# Patient Record
Sex: MALE | Race: BLACK OR AFRICAN AMERICAN | Employment: UNEMPLOYED | URBAN - METROPOLITAN AREA
[De-identification: names, ages, dates, MRNs, and addresses within clinical notes are randomized per-mention and may not be internally consistent; named-entity substitution may affect disease eponyms.]

---

## 2019-02-14 ENCOUNTER — HOSPITAL ENCOUNTER (OUTPATIENT)
Dept: REHABILITATION | Age: 49
End: 2019-03-29
Attending: PHYSICAL MEDICINE & REHABILITATION | Admitting: PHYSICAL MEDICINE & REHABILITATION

## 2019-02-14 DIAGNOSIS — I63.9 CEREBRAL INFARCTION (HCC): ICD-10-CM

## 2019-02-14 DIAGNOSIS — R13.12 DYSPHAGIA, OROPHARYNGEAL PHASE: ICD-10-CM

## 2019-02-15 LAB
25(OH)D3 SERPL-MCNC: 12.6 NG/ML (ref 30–100)
ALBUMIN SERPL-MCNC: 2.3 G/DL (ref 3.5–5)
ALBUMIN/GLOB SERPL: 0.6 {RATIO} (ref 1.1–2.2)
ALP SERPL-CCNC: 95 U/L (ref 45–117)
ALT SERPL-CCNC: 42 U/L (ref 12–78)
ANION GAP SERPL CALC-SCNC: 7 MMOL/L (ref 5–15)
APPEARANCE UR: CLEAR
AST SERPL-CCNC: 14 U/L (ref 15–37)
BACTERIA URNS QL MICRO: NEGATIVE /HPF
BILIRUB SERPL-MCNC: 0.3 MG/DL (ref 0.2–1)
BILIRUB UR QL: NEGATIVE
BUN SERPL-MCNC: 20 MG/DL (ref 6–20)
BUN/CREAT SERPL: 15 (ref 12–20)
CALCIUM SERPL-MCNC: 8.3 MG/DL (ref 8.5–10.1)
CHLORIDE SERPL-SCNC: 105 MMOL/L (ref 97–108)
CO2 SERPL-SCNC: 28 MMOL/L (ref 21–32)
COLOR UR: ABNORMAL
CREAT SERPL-MCNC: 1.33 MG/DL (ref 0.7–1.3)
EPITH CASTS URNS QL MICRO: ABNORMAL /LPF
ERYTHROCYTE [DISTWIDTH] IN BLOOD BY AUTOMATED COUNT: 15 % (ref 11.5–14.5)
GLOBULIN SER CALC-MCNC: 3.6 G/DL (ref 2–4)
GLUCOSE SERPL-MCNC: 112 MG/DL (ref 65–100)
GLUCOSE UR STRIP.AUTO-MCNC: NEGATIVE MG/DL
HCT VFR BLD AUTO: 27.8 % (ref 36.6–50.3)
HGB BLD-MCNC: 9.5 G/DL (ref 12.1–17)
HGB UR QL STRIP: NEGATIVE
HYALINE CASTS URNS QL MICRO: ABNORMAL /LPF (ref 0–5)
KETONES UR QL STRIP.AUTO: NEGATIVE MG/DL
LEUKOCYTE ESTERASE UR QL STRIP.AUTO: NEGATIVE
MAGNESIUM SERPL-MCNC: 2.1 MG/DL (ref 1.6–2.4)
MCH RBC QN AUTO: 31.3 PG (ref 26–34)
MCHC RBC AUTO-ENTMCNC: 34.2 G/DL (ref 30–36.5)
MCV RBC AUTO: 91.4 FL (ref 80–99)
NITRITE UR QL STRIP.AUTO: NEGATIVE
NRBC # BLD: 0 K/UL (ref 0–0.01)
NRBC BLD-RTO: 0 PER 100 WBC
PH UR STRIP: 7 [PH] (ref 5–8)
PLATELET # BLD AUTO: 415 K/UL (ref 150–400)
PMV BLD AUTO: 9.5 FL (ref 8.9–12.9)
POTASSIUM SERPL-SCNC: 3.6 MMOL/L (ref 3.5–5.1)
PROT SERPL-MCNC: 5.9 G/DL (ref 6.4–8.2)
PROT UR STRIP-MCNC: 100 MG/DL
RBC # BLD AUTO: 3.04 M/UL (ref 4.1–5.7)
RBC #/AREA URNS HPF: ABNORMAL /HPF (ref 0–5)
SODIUM SERPL-SCNC: 140 MMOL/L (ref 136–145)
SP GR UR REFRACTOMETRY: 1.01 (ref 1–1.03)
UA: UC IF INDICATED,UAUC: ABNORMAL
UROBILINOGEN UR QL STRIP.AUTO: 1 EU/DL (ref 0.2–1)
WBC # BLD AUTO: 7.5 K/UL (ref 4.1–11.1)
WBC URNS QL MICRO: ABNORMAL /HPF (ref 0–4)

## 2019-02-15 PROCEDURE — 83735 ASSAY OF MAGNESIUM: CPT

## 2019-02-15 PROCEDURE — 81001 URINALYSIS AUTO W/SCOPE: CPT

## 2019-02-15 PROCEDURE — 80053 COMPREHEN METABOLIC PANEL: CPT

## 2019-02-15 PROCEDURE — 36415 COLL VENOUS BLD VENIPUNCTURE: CPT

## 2019-02-15 PROCEDURE — 82306 VITAMIN D 25 HYDROXY: CPT

## 2019-02-15 PROCEDURE — 85027 COMPLETE CBC AUTOMATED: CPT

## 2019-02-16 LAB
ANION GAP SERPL CALC-SCNC: 6 MMOL/L (ref 5–15)
BUN SERPL-MCNC: 25 MG/DL (ref 6–20)
BUN/CREAT SERPL: 16 (ref 12–20)
CALCIUM SERPL-MCNC: 8 MG/DL (ref 8.5–10.1)
CHLORIDE SERPL-SCNC: 105 MMOL/L (ref 97–108)
CO2 SERPL-SCNC: 29 MMOL/L (ref 21–32)
CREAT SERPL-MCNC: 1.58 MG/DL (ref 0.7–1.3)
GLUCOSE SERPL-MCNC: 161 MG/DL (ref 65–100)
POTASSIUM SERPL-SCNC: 4.2 MMOL/L (ref 3.5–5.1)
SODIUM SERPL-SCNC: 140 MMOL/L (ref 136–145)

## 2019-02-16 PROCEDURE — 36415 COLL VENOUS BLD VENIPUNCTURE: CPT

## 2019-02-16 PROCEDURE — 80048 BASIC METABOLIC PNL TOTAL CA: CPT

## 2019-02-16 PROCEDURE — 80177 DRUG SCRN QUAN LEVETIRACETAM: CPT

## 2019-02-17 LAB — LEVETIRACETAM SERPL-MCNC: 41.5 UG/ML (ref 10–40)

## 2019-02-18 ENCOUNTER — APPOINTMENT (OUTPATIENT)
Dept: GENERAL RADIOLOGY | Age: 49
DRG: 175 | End: 2019-02-18
Attending: EMERGENCY MEDICINE
Payer: MEDICAID

## 2019-02-18 ENCOUNTER — HOSPITAL ENCOUNTER (INPATIENT)
Age: 49
LOS: 1 days | Discharge: NURSING, REHAB OR LONG-TERM CARE | DRG: 175 | End: 2019-02-19
Attending: EMERGENCY MEDICINE | Admitting: FAMILY MEDICINE
Payer: MEDICAID

## 2019-02-18 ENCOUNTER — ANESTHESIA (OUTPATIENT)
Dept: CARDIAC CATH/INVASIVE PROCEDURES | Age: 49
End: 2019-02-18

## 2019-02-18 ENCOUNTER — ANESTHESIA EVENT (OUTPATIENT)
Dept: CARDIAC CATH/INVASIVE PROCEDURES | Age: 49
End: 2019-02-18

## 2019-02-18 ENCOUNTER — APPOINTMENT (OUTPATIENT)
Dept: NON INVASIVE DIAGNOSTICS | Age: 49
DRG: 175 | End: 2019-02-18
Attending: INTERNAL MEDICINE
Payer: MEDICAID

## 2019-02-18 DIAGNOSIS — I48.92 ATRIAL FLUTTER WITH RAPID VENTRICULAR RESPONSE (HCC): Primary | ICD-10-CM

## 2019-02-18 DIAGNOSIS — I48.92 ATRIAL FLUTTER, UNSPECIFIED TYPE (HCC): ICD-10-CM

## 2019-02-18 DIAGNOSIS — I95.9 HYPOTENSION, UNSPECIFIED HYPOTENSION TYPE: ICD-10-CM

## 2019-02-18 DIAGNOSIS — I69.320 CVA, OLD, APHASIA: ICD-10-CM

## 2019-02-18 PROBLEM — E78.49 OTHER HYPERLIPIDEMIA: Status: ACTIVE | Noted: 2019-02-18

## 2019-02-18 PROBLEM — E11.9 DIABETES MELLITUS TYPE 2, CONTROLLED (HCC): Status: ACTIVE | Noted: 2019-02-18

## 2019-02-18 PROBLEM — I63.9 STROKE (CEREBRUM) (HCC): Status: ACTIVE | Noted: 2019-02-18

## 2019-02-18 LAB
ALBUMIN SERPL-MCNC: 2.6 G/DL (ref 3.5–5)
ALBUMIN/GLOB SERPL: 0.7 {RATIO} (ref 1.1–2.2)
ALP SERPL-CCNC: 105 U/L (ref 45–117)
ALT SERPL-CCNC: 39 U/L (ref 12–78)
ANION GAP SERPL CALC-SCNC: 6 MMOL/L (ref 5–15)
ANION GAP SERPL CALC-SCNC: 6 MMOL/L (ref 5–15)
AST SERPL-CCNC: 14 U/L (ref 15–37)
ATRIAL RATE: 288 BPM
BASOPHILS # BLD: 0.1 K/UL (ref 0–0.1)
BASOPHILS NFR BLD: 1 % (ref 0–1)
BILIRUB SERPL-MCNC: 0.2 MG/DL (ref 0.2–1)
BUN SERPL-MCNC: 25 MG/DL (ref 6–20)
BUN SERPL-MCNC: 26 MG/DL (ref 6–20)
BUN/CREAT SERPL: 19 (ref 12–20)
BUN/CREAT SERPL: 21 (ref 12–20)
CALCIUM SERPL-MCNC: 8.4 MG/DL (ref 8.5–10.1)
CALCIUM SERPL-MCNC: 8.5 MG/DL (ref 8.5–10.1)
CALCULATED P AXIS, ECG09: -90 DEGREES
CALCULATED R AXIS, ECG10: 28 DEGREES
CALCULATED T AXIS, ECG11: 96 DEGREES
CHLORIDE SERPL-SCNC: 104 MMOL/L (ref 97–108)
CHLORIDE SERPL-SCNC: 104 MMOL/L (ref 97–108)
CK SERPL-CCNC: 23 U/L (ref 39–308)
CO2 SERPL-SCNC: 29 MMOL/L (ref 21–32)
CO2 SERPL-SCNC: 30 MMOL/L (ref 21–32)
CREAT SERPL-MCNC: 1.2 MG/DL (ref 0.7–1.3)
CREAT SERPL-MCNC: 1.34 MG/DL (ref 0.7–1.3)
DIAGNOSIS, 93000: NORMAL
DIFFERENTIAL METHOD BLD: ABNORMAL
EOSINOPHIL # BLD: 0.1 K/UL (ref 0–0.4)
EOSINOPHIL NFR BLD: 1 % (ref 0–7)
ERYTHROCYTE [DISTWIDTH] IN BLOOD BY AUTOMATED COUNT: 14.6 % (ref 11.5–14.5)
EST. AVERAGE GLUCOSE BLD GHB EST-MCNC: 171 MG/DL
GLOBULIN SER CALC-MCNC: 3.5 G/DL (ref 2–4)
GLUCOSE BLD STRIP.AUTO-MCNC: 191 MG/DL (ref 65–100)
GLUCOSE BLD STRIP.AUTO-MCNC: 218 MG/DL (ref 65–100)
GLUCOSE BLD STRIP.AUTO-MCNC: 236 MG/DL (ref 65–100)
GLUCOSE SERPL-MCNC: 161 MG/DL (ref 65–100)
GLUCOSE SERPL-MCNC: 194 MG/DL (ref 65–100)
HBA1C MFR BLD: 7.6 % (ref 4.2–6.3)
HCT VFR BLD AUTO: 30.9 % (ref 36.6–50.3)
HGB BLD-MCNC: 10.3 G/DL (ref 12.1–17)
IMM GRANULOCYTES # BLD AUTO: 0 K/UL (ref 0–0.04)
IMM GRANULOCYTES NFR BLD AUTO: 0 % (ref 0–0.5)
INR PPP: 1.7 (ref 0.9–1.1)
LYMPHOCYTES # BLD: 3.6 K/UL (ref 0.8–3.5)
LYMPHOCYTES NFR BLD: 29 % (ref 12–49)
MAGNESIUM SERPL-MCNC: 2.3 MG/DL (ref 1.6–2.4)
MCH RBC QN AUTO: 31.2 PG (ref 26–34)
MCHC RBC AUTO-ENTMCNC: 33.3 G/DL (ref 30–36.5)
MCV RBC AUTO: 93.6 FL (ref 80–99)
MONOCYTES # BLD: 1.3 K/UL (ref 0–1)
MONOCYTES NFR BLD: 10 % (ref 5–13)
NEUTS SEG # BLD: 7.4 K/UL (ref 1.8–8)
NEUTS SEG NFR BLD: 60 % (ref 32–75)
NRBC # BLD: 0 K/UL (ref 0–0.01)
NRBC BLD-RTO: 0 PER 100 WBC
PLATELET # BLD AUTO: 398 K/UL (ref 150–400)
PMV BLD AUTO: 9.3 FL (ref 8.9–12.9)
POTASSIUM SERPL-SCNC: 4.2 MMOL/L (ref 3.5–5.1)
POTASSIUM SERPL-SCNC: 4.4 MMOL/L (ref 3.5–5.1)
PROT SERPL-MCNC: 6.1 G/DL (ref 6.4–8.2)
PROTHROMBIN TIME: 16.6 SEC (ref 9–11.1)
Q-T INTERVAL, ECG07: 362 MS
QRS DURATION, ECG06: 86 MS
QTC CALCULATION (BEZET), ECG08: 524 MS
RBC # BLD AUTO: 3.3 M/UL (ref 4.1–5.7)
SERVICE CMNT-IMP: ABNORMAL
SODIUM SERPL-SCNC: 139 MMOL/L (ref 136–145)
SODIUM SERPL-SCNC: 140 MMOL/L (ref 136–145)
TROPONIN I SERPL-MCNC: <0.05 NG/ML
VENTRICULAR RATE, ECG03: 126 BPM
WBC # BLD AUTO: 12.3 K/UL (ref 4.1–11.1)

## 2019-02-18 PROCEDURE — 36415 COLL VENOUS BLD VENIPUNCTURE: CPT

## 2019-02-18 PROCEDURE — 83036 HEMOGLOBIN GLYCOSYLATED A1C: CPT

## 2019-02-18 PROCEDURE — 80053 COMPREHEN METABOLIC PANEL: CPT

## 2019-02-18 PROCEDURE — 4A023FZ MEASUREMENT OF CARDIAC RHYTHM, PERCUTANEOUS APPROACH: ICD-10-PCS | Performed by: INTERNAL MEDICINE

## 2019-02-18 PROCEDURE — 77030018729 HC ELECTRD DEFIB PAD CARD -B: Performed by: INTERNAL MEDICINE

## 2019-02-18 PROCEDURE — 99153 MOD SED SAME PHYS/QHP EA: CPT | Performed by: INTERNAL MEDICINE

## 2019-02-18 PROCEDURE — 93005 ELECTROCARDIOGRAM TRACING: CPT

## 2019-02-18 PROCEDURE — 74011250636 HC RX REV CODE- 250/636

## 2019-02-18 PROCEDURE — 02583ZZ DESTRUCTION OF CONDUCTION MECHANISM, PERCUTANEOUS APPROACH: ICD-10-PCS | Performed by: INTERNAL MEDICINE

## 2019-02-18 PROCEDURE — 96360 HYDRATION IV INFUSION INIT: CPT

## 2019-02-18 PROCEDURE — C1733 CATH, EP, OTHR THAN COOL-TIP: HCPCS | Performed by: INTERNAL MEDICINE

## 2019-02-18 PROCEDURE — 93312 ECHO TRANSESOPHAGEAL: CPT

## 2019-02-18 PROCEDURE — 77030030806 HC PTCH ENSIT NAVX STJU -G: Performed by: INTERNAL MEDICINE

## 2019-02-18 PROCEDURE — 71045 X-RAY EXAM CHEST 1 VIEW: CPT

## 2019-02-18 PROCEDURE — C1894 INTRO/SHEATH, NON-LASER: HCPCS | Performed by: INTERNAL MEDICINE

## 2019-02-18 PROCEDURE — 74011250637 HC RX REV CODE- 250/637: Performed by: FAMILY MEDICINE

## 2019-02-18 PROCEDURE — 74011250637 HC RX REV CODE- 250/637: Performed by: INTERNAL MEDICINE

## 2019-02-18 PROCEDURE — 74011250636 HC RX REV CODE- 250/636: Performed by: FAMILY MEDICINE

## 2019-02-18 PROCEDURE — 77010033678 HC OXYGEN DAILY

## 2019-02-18 PROCEDURE — 02K83ZZ MAP CONDUCTION MECHANISM, PERCUTANEOUS APPROACH: ICD-10-PCS | Performed by: INTERNAL MEDICINE

## 2019-02-18 PROCEDURE — 84484 ASSAY OF TROPONIN QUANT: CPT

## 2019-02-18 PROCEDURE — 93621 COMP EP EVL L PAC&REC C SINS: CPT | Performed by: INTERNAL MEDICINE

## 2019-02-18 PROCEDURE — 74011250636 HC RX REV CODE- 250/636: Performed by: INTERNAL MEDICINE

## 2019-02-18 PROCEDURE — 93653 COMPRE EP EVAL TX SVT: CPT | Performed by: INTERNAL MEDICINE

## 2019-02-18 PROCEDURE — 93613 INTRACARDIAC EPHYS 3D MAPG: CPT

## 2019-02-18 PROCEDURE — 85025 COMPLETE CBC W/AUTO DIFF WBC: CPT

## 2019-02-18 PROCEDURE — C1730 CATH, EP, 19 OR FEW ELECT: HCPCS | Performed by: INTERNAL MEDICINE

## 2019-02-18 PROCEDURE — 77030015398 HC CBL EP EXT STJU -C: Performed by: INTERNAL MEDICINE

## 2019-02-18 PROCEDURE — 82962 GLUCOSE BLOOD TEST: CPT

## 2019-02-18 PROCEDURE — 65660000000 HC RM CCU STEPDOWN

## 2019-02-18 PROCEDURE — 83735 ASSAY OF MAGNESIUM: CPT

## 2019-02-18 PROCEDURE — 85610 PROTHROMBIN TIME: CPT

## 2019-02-18 PROCEDURE — 77030018798 HC PMP KT ENTRL FED COVD -A

## 2019-02-18 PROCEDURE — 82550 ASSAY OF CK (CPK): CPT

## 2019-02-18 PROCEDURE — 74011636637 HC RX REV CODE- 636/637: Performed by: FAMILY MEDICINE

## 2019-02-18 PROCEDURE — 99285 EMERGENCY DEPT VISIT HI MDM: CPT

## 2019-02-18 PROCEDURE — 99152 MOD SED SAME PHYS/QHP 5/>YRS: CPT | Performed by: INTERNAL MEDICINE

## 2019-02-18 PROCEDURE — 74011250636 HC RX REV CODE- 250/636: Performed by: EMERGENCY MEDICINE

## 2019-02-18 RX ORDER — SODIUM CHLORIDE 0.9 % (FLUSH) 0.9 %
5-40 SYRINGE (ML) INJECTION AS NEEDED
Status: DISCONTINUED | OUTPATIENT
Start: 2019-02-18 | End: 2019-02-19 | Stop reason: HOSPADM

## 2019-02-18 RX ORDER — PANTOPRAZOLE SODIUM 40 MG/1
40 GRANULE, DELAYED RELEASE ORAL
COMMUNITY

## 2019-02-18 RX ORDER — ATORVASTATIN CALCIUM 80 MG/1
80 TABLET, FILM COATED ORAL
COMMUNITY

## 2019-02-18 RX ORDER — ACETAMINOPHEN 325 MG/1
650 TABLET ORAL
COMMUNITY

## 2019-02-18 RX ORDER — DILTIAZEM HYDROCHLORIDE 30 MG/1
90 TABLET, FILM COATED ORAL 4 TIMES DAILY
Status: ON HOLD | COMMUNITY
End: 2019-02-19 | Stop reason: SDUPTHER

## 2019-02-18 RX ORDER — SERTRALINE HYDROCHLORIDE 50 MG/1
50 TABLET, FILM COATED ORAL DAILY
Status: DISCONTINUED | OUTPATIENT
Start: 2019-02-19 | End: 2019-02-18

## 2019-02-18 RX ORDER — TRAMADOL HYDROCHLORIDE 50 MG/1
25-50 TABLET ORAL
COMMUNITY

## 2019-02-18 RX ORDER — ENOXAPARIN SODIUM 100 MG/ML
60 INJECTION SUBCUTANEOUS EVERY 12 HOURS
Status: DISCONTINUED | OUTPATIENT
Start: 2019-02-18 | End: 2019-02-19 | Stop reason: SDUPTHER

## 2019-02-18 RX ORDER — MAGNESIUM SULFATE 100 %
4 CRYSTALS MISCELLANEOUS AS NEEDED
Status: DISCONTINUED | OUTPATIENT
Start: 2019-02-18 | End: 2019-02-19 | Stop reason: HOSPADM

## 2019-02-18 RX ORDER — LEVETIRACETAM 100 MG/ML
750 SOLUTION ORAL EVERY 12 HOURS
COMMUNITY

## 2019-02-18 RX ORDER — SERTRALINE HYDROCHLORIDE 50 MG/1
100 TABLET, FILM COATED ORAL DAILY
Status: DISCONTINUED | OUTPATIENT
Start: 2019-02-19 | End: 2019-02-19 | Stop reason: HOSPADM

## 2019-02-18 RX ORDER — INSULIN LISPRO 100 [IU]/ML
INJECTION, SOLUTION INTRAVENOUS; SUBCUTANEOUS
Status: DISCONTINUED | OUTPATIENT
Start: 2019-02-18 | End: 2019-02-19 | Stop reason: HOSPADM

## 2019-02-18 RX ORDER — LEVETIRACETAM 100 MG/ML
750 SOLUTION ORAL 2 TIMES DAILY
Status: DISCONTINUED | OUTPATIENT
Start: 2019-02-18 | End: 2019-02-19 | Stop reason: HOSPADM

## 2019-02-18 RX ORDER — PREDNISONE 10 MG/1
5-10 TABLET ORAL
COMMUNITY

## 2019-02-18 RX ORDER — IPRATROPIUM BROMIDE AND ALBUTEROL SULFATE 2.5; .5 MG/3ML; MG/3ML
3 SOLUTION RESPIRATORY (INHALATION)
Status: DISCONTINUED | OUTPATIENT
Start: 2019-02-18 | End: 2019-02-19 | Stop reason: HOSPADM

## 2019-02-18 RX ORDER — SODIUM CHLORIDE 0.9 % (FLUSH) 0.9 %
5-40 SYRINGE (ML) INJECTION EVERY 8 HOURS
Status: DISCONTINUED | OUTPATIENT
Start: 2019-02-18 | End: 2019-02-19 | Stop reason: HOSPADM

## 2019-02-18 RX ORDER — GUAIFENESIN 100 MG/5ML
200 SOLUTION ORAL
COMMUNITY

## 2019-02-18 RX ORDER — HEPARIN SODIUM 200 [USP'U]/100ML
INJECTION, SOLUTION INTRAVENOUS
Status: COMPLETED | OUTPATIENT
Start: 2019-02-18 | End: 2019-02-18

## 2019-02-18 RX ORDER — NYSTATIN 100000 [USP'U]/ML
1 SUSPENSION ORAL 4 TIMES DAILY
COMMUNITY

## 2019-02-18 RX ORDER — PANTOPRAZOLE SODIUM 40 MG/1
40 TABLET, DELAYED RELEASE ORAL
Status: DISCONTINUED | OUTPATIENT
Start: 2019-02-19 | End: 2019-02-19 | Stop reason: HOSPADM

## 2019-02-18 RX ORDER — ACETAMINOPHEN 325 MG/1
650 TABLET ORAL
Status: DISCONTINUED | OUTPATIENT
Start: 2019-02-18 | End: 2019-02-19 | Stop reason: HOSPADM

## 2019-02-18 RX ORDER — ATORVASTATIN CALCIUM 40 MG/1
80 TABLET, FILM COATED ORAL DAILY
Status: DISCONTINUED | OUTPATIENT
Start: 2019-02-19 | End: 2019-02-19 | Stop reason: HOSPADM

## 2019-02-18 RX ORDER — WARFARIN 7.5 MG/1
6 TABLET ORAL EVERY EVENING
COMMUNITY

## 2019-02-18 RX ORDER — NYSTATIN 100000 [USP'U]/ML
500000 SUSPENSION ORAL 4 TIMES DAILY
Status: DISCONTINUED | OUTPATIENT
Start: 2019-02-18 | End: 2019-02-19 | Stop reason: HOSPADM

## 2019-02-18 RX ORDER — SODIUM CHLORIDE 9 MG/ML
50 INJECTION, SOLUTION INTRAVENOUS CONTINUOUS
Status: DISCONTINUED | OUTPATIENT
Start: 2019-02-18 | End: 2019-02-19

## 2019-02-18 RX ORDER — PREDNISONE 5 MG/1
5 TABLET ORAL
Status: DISCONTINUED | OUTPATIENT
Start: 2019-02-19 | End: 2019-02-19 | Stop reason: HOSPADM

## 2019-02-18 RX ORDER — DEXTROSE 50 % IN WATER (D50W) INTRAVENOUS SYRINGE
25-50 AS NEEDED
Status: DISCONTINUED | OUTPATIENT
Start: 2019-02-18 | End: 2019-02-19 | Stop reason: HOSPADM

## 2019-02-18 RX ORDER — PREDNISONE 5 MG/1
5 TABLET ORAL DAILY
COMMUNITY
End: 2019-02-18

## 2019-02-18 RX ORDER — LIDOCAINE HYDROCHLORIDE 10 MG/ML
INJECTION INFILTRATION; PERINEURAL AS NEEDED
Status: DISCONTINUED | OUTPATIENT
Start: 2019-02-18 | End: 2019-02-18 | Stop reason: HOSPADM

## 2019-02-18 RX ORDER — BACLOFEN 10 MG/1
5 TABLET ORAL 2 TIMES DAILY
COMMUNITY

## 2019-02-18 RX ORDER — MIDAZOLAM HYDROCHLORIDE 1 MG/ML
INJECTION, SOLUTION INTRAMUSCULAR; INTRAVENOUS AS NEEDED
Status: DISCONTINUED | OUTPATIENT
Start: 2019-02-18 | End: 2019-02-18 | Stop reason: HOSPADM

## 2019-02-18 RX ORDER — DOBUTAMINE HYDROCHLORIDE 200 MG/100ML
INJECTION INTRAVENOUS
Status: COMPLETED | OUTPATIENT
Start: 2019-02-18 | End: 2019-02-18

## 2019-02-18 RX ORDER — IPRATROPIUM BROMIDE AND ALBUTEROL SULFATE 2.5; .5 MG/3ML; MG/3ML
3 SOLUTION RESPIRATORY (INHALATION)
COMMUNITY

## 2019-02-18 RX ORDER — FENTANYL CITRATE 50 UG/ML
INJECTION, SOLUTION INTRAMUSCULAR; INTRAVENOUS AS NEEDED
Status: DISCONTINUED | OUTPATIENT
Start: 2019-02-18 | End: 2019-02-18 | Stop reason: HOSPADM

## 2019-02-18 RX ORDER — INSULIN LISPRO 100 [IU]/ML
2-8 INJECTION, SOLUTION INTRAVENOUS; SUBCUTANEOUS
COMMUNITY

## 2019-02-18 RX ORDER — BACLOFEN 10 MG/1
5 TABLET ORAL 3 TIMES DAILY
Status: DISCONTINUED | OUTPATIENT
Start: 2019-02-18 | End: 2019-02-19 | Stop reason: HOSPADM

## 2019-02-18 RX ORDER — SERTRALINE HYDROCHLORIDE 50 MG/1
100 TABLET, FILM COATED ORAL DAILY
COMMUNITY

## 2019-02-18 RX ADMIN — Medication 10 ML: at 22:00

## 2019-02-18 RX ADMIN — ENOXAPARIN SODIUM 60 MG: 60 INJECTION, SOLUTION INTRAVENOUS; SUBCUTANEOUS at 21:40

## 2019-02-18 RX ADMIN — SODIUM CHLORIDE 50 ML/HR: 900 INJECTION, SOLUTION INTRAVENOUS at 21:40

## 2019-02-18 RX ADMIN — NYSTATIN 500000 UNITS: 100000 SUSPENSION ORAL at 21:40

## 2019-02-18 RX ADMIN — LEVETIRACETAM 750 MG: 500 SOLUTION ORAL at 22:01

## 2019-02-18 RX ADMIN — WARFARIN SODIUM 6 MG: 5 TABLET ORAL at 21:40

## 2019-02-18 RX ADMIN — INSULIN HUMAN 5 UNITS: 100 INJECTION, SUSPENSION SUBCUTANEOUS at 21:40

## 2019-02-18 RX ADMIN — SODIUM CHLORIDE 1000 ML: 900 INJECTION, SOLUTION INTRAVENOUS at 10:15

## 2019-02-18 RX ADMIN — BACLOFEN 5 MG: 10 TABLET ORAL at 23:40

## 2019-02-18 NOTE — H&P
Hospitalist Admission NoteNAME: Ketan Rajan :  1970 MRN:  [id number] Date/Time:  2019 11:10 AM 
 
Patient PCP: None 
______________________________________________________________________ Given the patient's current clinical presentation, I have a high level of concern for decompensation if discharged from the emergency department. Complex decision making was performed, which includes reviewing the patient's available past medical records, laboratory results, and x-ray films. My assessment of this patient's clinical condition and my plan of care is as follows. Assessment / Plan: Active Problems: 
  Atrial flutter ([facility] (2019) Aflutter and RVR on cardizem gtt, patient is going to EP lab now for ablation, BP stable after IVF, still RVR 
  CVA, old, aphasia (2019) Aphasia and left side paresis, continue PT while inpatient, will dischage to [facility] when stable Diabetes mellitus type 2, controlled ([facility] (2019) Hyperglycemic, SSI and Lantus when medication reconciled, discussed with jg, will call MD when med rec  done Other hyperlipidemia (2019) Will resme statin Stroke (cerebrum) ([facility] (2019) Continue PT/OT while inpatient, [facility] Feeding Tube: Ordered same feeding tube from [facility], Insulin need to be adjusted, please adjust insulin. Code Status: full code Surrogate Decision Maker: DVT Prophylaxis: on Warfarin, pharmacy to dose GI Prophylaxis: not indicated Baseline: S/P stroke in Rehab, left side paresis, expressive aphasia Subjective: CHIEF COMPLAINT: Low BP and tachy cardia, referral from [facility] HISTORY OF PRESENT ILLNESS:    
Ketan Rajan, 46 y.o. male with PMHx significant for CVA, HTN, seizures, A-fib, DM and CKD presents via EMS to the ED: he is S./P stroke with left side weakness and aphasia, he is in rehab where RN notice low BP and tachycardia 140/minutes, upon arrival he was hypotensive and in Aflutter with RVR. Given IVF,  started on  Cardizem drip, symptoms improved with intervention, cardiology consulted and they are about to take him to cardiac lab for ablation. Patient unable to provide information due to his aphasia. Responds to question  by nodding, he has been in [address] [place] [address] since 2/14. Pt specifically denies CP, SOB, a fever, chills or lightheadedness We were asked to admit for work up and evaluation of the above problems. Past Medical History:  
Diagnosis Date  A-fib ([facility] [place] [facility]  Anemia  Chronic kidney disease  CVA (cerebrovascular accident) ([facility] [place] [facility] [facility] BILATERAL BASAL GANGLIA  Diabetes ([facility] [place] [facility] INSULIN DEPENDANT  Gastrointestinal disorder GERD/GASTRIC ULCERS  
 Hypertension  Psychiatric disorder SCHIZOAFFECTIVE  Seizures ([facility] Past Surgical History:  
Procedure Laterality Date  ABDOMEN SURGERY PROC UNLISTED    
 PEG PLACEMENT Social History Tobacco Use  Smoking status: Not on file Substance Use Topics  Alcohol use: Not on file No family history on file. Allergies Allergen Reactions  Shellfish Derived Unable to Obtain Prior to Admission medications Medication Sig Start Date End Date Taking? Authorizing Provider  
insulin lispro (HUMALOG) 100 unit/mL injection 2-8 Units by SubCUTAneous route. Yes Maris, MD Aicha  
insulin NPH (NOVOLIN N NPH U-100 INSULIN) 100 unit/mL injection by SubCUTAneous route once. Yes Aicha Pollock MD  
atorvastatin (LIPITOR) 80 mg tablet Take 80 mg by mouth daily. Yes Aicha Pollock MD  
baclofen (LIORESAL) 10 mg tablet 5 mg by Per G Tube route three (3) times daily. Yes Aicha Pollock MD  
dilTIAZem (CARDIZEM) 30 mg tablet by Per G Tube route. Yes Aicha Pollock MD  
levETIRAcetam (KEPPRA) 100 mg/mL solution 10 mg/kg by Per G Tube route two (2) times a day.    Yes Aicha Pollock MD  
 nystatin (MYCOSTATIN) 100,000 unit/mL suspension by Per G Tube route two (2) times a day. swish and spit   Yes Maris, MD Aicha  
pantoprazole (PROTONIX) 40 mg granules for oral suspension 40 mg by Per G Tube route daily. Yes Other, MD Aicha  
predniSONE (DELTASONE) 10 mg tablet 10 mg by Per G Tube route daily (with breakfast). Yes Maris, MD Aicha  
predniSONE (DELTASONE) 5 mg tablet 5 mg by Per G Tube route daily. Yes Maris, MD Aicha  
sertraline (ZOLOFT) 50 mg tablet 50 mg by Per G Tube route daily. Yes Maris, MD Aicha  
warfarin (COUMADIN) 2 mg tablet 6 mg by Per G Tube route daily. Yes Maris, MD Aicha  
acetaminophen (TYLENOL) 325 mg tablet 650 mg by Per G Tube route every three (3) hours as needed for Pain. Yes Maris, MD Aicha  
albuterol-ipratropium (DUO-NEB) 2.5 mg-0.5 mg/3 ml nebu 3 mL by Nebulization route every three (3) hours as needed. Yes Maris, MD Aicha  
guaiFENesin (ROBITUSSIN) 100 mg/5 mL liquid Take 200 mg by mouth three (3) times daily as needed for Cough. Yes Maris, MD Aicha  
traMADol (ULTRAM) 50 mg tablet 50 mg by Per G Tube route every six (6) hours as needed for Pain. Yes Maris, MD Aicha  
 
 
REVIEW OF SYSTEMS:    
I am not able to complete the review of systems because: The patient is intubated and sedated The patient has altered mental status due to his acute medical problems X The patient has baseline aphasia from prior stroke(s) The patient has baseline dementia and is not reliable historian The patient is in acute medical distress and unable to provide information Total of 12 systems reviewed as follows:   
   POSITIVE= underlined text  Negative = text not underlined General:  fever, chills, sweats, generalized weakness, weight loss/gain,  
   loss of appetite Eyes:    blurred vision, eye pain, loss of vision, double vision ENT:    rhinorrhea, pharyngitis Respiratory:   cough, sputum production, SOB, MENDEZ, wheezing, pleuritic pain Cardiology:   chest pain, palpitations, orthopnea, PND, edema, syncope Gastrointestinal:  abdominal pain , N/V, diarrhea, dysphagia, constipation, bleeding Genitourinary:  frequency, urgency, dysuria, hematuria, incontinence Muskuloskeletal :  arthralgia, myalgia, back pain Hematology:  easy bruising, nose or gum bleeding, lymphadenopathy Dermatological: rash, ulceration, pruritis, color change / jaundice Endocrine:   hot flashes or polydipsia Neurological:  headache, dizziness, confusion, focal weakness, paresthesia, Speech difficulties, memory loss, gait difficulty Psychological: Feelings of anxiety, depression, agitation Objective: VITALS:   
Visit Vitals /75 Pulse (!) 141 Temp 98.1 °F (36.7 °C) Resp 16 SpO2 100% PHYSICAL EXAM: 
 
General:    Alert, cooperative, no distress, appears stated age. HEENT: Atraumatic, anicteric sclerae, pink conjunctivae No oral ulcers, mucosa moist, throat clear, dentition fair Neck:  Supple, symmetrical,  thyroid: non tender Lungs:   Clear to auscultation bilaterally. No Wheezing or Rhonchi. No rales. Chest wall:  No tenderness  No Accessory muscle use. Heart:   Tachycardia,   No  murmur   No edema Abdomen:   Soft, non-tender. Not distended. Bowel sounds normal 
Extremities: No cyanosis. No clubbing,   
  Skin turgor normal, Capillary refill normal, Radial dial pulse 2+ Skin:     Not pale. Not Jaundiced  No rashes Psych:  . Not anxious or agitated. Neurologic: EOMs intact. No facial asymmetry. Positive for  aphasia . left side paresis , Sensation grossly intact. Alert and oriented . 
 
_______________________________________________________________________ Care Plan discussed with: 
  Comments Patient Family RN Care Manager Consultant:     
_______________________________________________________________________ Expected  Disposition:  
Home with Family HH/PT/OT/RN   
SNF/LTC TIM   
________________________________________________________________________ TOTAL TIME:  40 Minutes Critical Care Provided     Minutes non procedure based Comments Reviewed previous records  
>50% of visit spent in counseling and coordination of care  Discussion with patient and/or family and questions answered 
  
 
________________________________________________________________________ Signed: Ashley Johnson MD 
 
Procedures: see electronic medical records for all procedures/Xrays and details which were not copied into this note but were reviewed prior to creation of Plan. LAB DATA REVIEWED:   
Recent Results (from the past 24 hour(s)) METABOLIC PANEL, BASIC Collection Time: 02/18/19  4:45 AM  
Result Value Ref Range Sodium 139 136 - 145 mmol/L Potassium 4.2 3.5 - 5.1 mmol/L Chloride 104 97 - 108 mmol/L  
 CO2 29 21 - 32 mmol/L Anion gap 6 5 - 15 mmol/L Glucose 161 (H) 65 - 100 mg/dL BUN 25 (H) 6 - 20 MG/DL Creatinine 1.20 0.70 - 1.30 MG/DL  
 BUN/Creatinine ratio 21 (H) 12 - 20 GFR est AA >60 >60 ml/min/1.73m2 GFR est non-AA >60 >60 ml/min/1.73m2 Calcium 8.4 (L) 8.5 - 10.1 MG/DL  
EKG, 12 LEAD, INITIAL Collection Time: 02/18/19  9:53 AM  
Result Value Ref Range Ventricular Rate 123 BPM  
 Atrial Rate 294 BPM  
 QRS Duration 82 ms Q-T Interval 264 ms QTC Calculation (Bezet) 377 ms Calculated R Axis 12 degrees Calculated T Axis -155 degrees Diagnosis ** Poor data quality, interpretation may be adversely affected Atrial flutter with variable AV block with premature ventricular or  
aberrantly conducted complexes T wave abnormality, consider inferior ischemia T wave abnormality, consider anterolateral ischemia No previous ECGs available CBC WITH AUTOMATED DIFF Collection Time: 02/18/19 10:17 AM  
Result Value Ref Range WBC 12.3 (H) 4.1 - 11.1 K/uL  
 RBC 3.30 (L) 4.10 - 5.70 M/uL  
 HGB 10.3 (L) 12.1 - 17.0 g/dL HCT 30.9 (L) 36.6 - 50.3 % MCV 93.6 80.0 - 99.0 FL  
 MCH 31.2 26.0 - 34.0 PG  
 MCHC 33.3 30.0 - 36.5 g/dL  
 RDW 14.6 (H) 11.5 - 14.5 % PLATELET 657 264 - 638 K/uL MPV 9.3 8.9 - 12.9 FL  
 NRBC 0.0 0  WBC ABSOLUTE NRBC 0.00 0.00 - 0.01 K/uL NEUTROPHILS 60 32 - 75 % LYMPHOCYTES 29 12 - 49 % MONOCYTES 10 5 - 13 % EOSINOPHILS 1 0 - 7 % BASOPHILS 1 0 - 1 % IMMATURE GRANULOCYTES 0 0.0 - 0.5 % ABS. NEUTROPHILS 7.4 1.8 - 8.0 K/UL  
 ABS. LYMPHOCYTES 3.6 (H) 0.8 - 3.5 K/UL  
 ABS. MONOCYTES 1.3 (H) 0.0 - 1.0 K/UL  
 ABS. EOSINOPHILS 0.1 0.0 - 0.4 K/UL  
 ABS. BASOPHILS 0.1 0.0 - 0.1 K/UL  
 ABS. IMM. GRANS. 0.0 0.00 - 0.04 K/UL  
 DF AUTOMATED PROTHROMBIN TIME + INR Collection Time: 02/18/19 10:17 AM  
Result Value Ref Range INR 1.7 (H) 0.9 - 1.1 Prothrombin time 16.6 (H) 9.0 - 11.1 sec METABOLIC PANEL, COMPREHENSIVE Collection Time: 02/18/19 10:17 AM  
Result Value Ref Range Sodium 140 136 - 145 mmol/L Potassium 4.4 3.5 - 5.1 mmol/L Chloride 104 97 - 108 mmol/L  
 CO2 30 21 - 32 mmol/L Anion gap 6 5 - 15 mmol/L Glucose 194 (H) 65 - 100 mg/dL BUN 26 (H) 6 - 20 MG/DL Creatinine 1.34 (H) 0.70 - 1.30 MG/DL  
 BUN/Creatinine ratio 19 12 - 20 GFR est AA >60 >60 ml/min/1.73m2 GFR est non-AA 56 (L) >60 ml/min/1.73m2 Calcium 8.5 8.5 - 10.1 MG/DL Bilirubin, total 0.2 0.2 - 1.0 MG/DL  
 ALT (SGPT) 39 12 - 78 U/L  
 AST (SGOT) 14 (L) 15 - 37 U/L Alk. phosphatase 105 45 - 117 U/L Protein, total 6.1 (L) 6.4 - 8.2 g/dL Albumin 2.6 (L) 3.5 - 5.0 g/dL Globulin 3.5 2.0 - 4.0 g/dL A-G Ratio 0.7 (L) 1.1 - 2.2 MAGNESIUM Collection Time: 02/18/19 10:17 AM  
Result Value Ref Range Magnesium 2.3 1.6 - 2.4 mg/dL CK Collection Time: 02/18/19 10:17 AM  
Result Value Ref Range CK 23 (L) 39 - 308 U/L  
TROPONIN I Collection Time: 02/18/19 10:17 AM  
Result Value Ref Range Troponin-I, Qt. <0.05 <0.05 ng/mL

## 2019-02-18 NOTE — PROGRESS NOTES
Pharmacy Clarification of the Prior to Admission Medication Regimen Retrospective to the Admission Medication Reconciliation The patient was not interviewed regarding clarification of the prior to admission medication regimen. Patient was transferred from [facility], to [address] [place] [address]. [facility] updated the PTA med list based on the information received from [facility]. Information Obtained From: transfer papers Recommendations/Findings: The following amendments were made to the patient's active medication list on file at [id number] [place] [address]:  
 
1) Additions: NONE 2) Removals: NONE 3) Changes: 
? baclofen (LIORESAL) 10 mg tablet (Old regimen: 5 mg TID /New regimen: 5 mg BID) ? dilTIAZem (CARDIZEM) 30 mg tablet (Old regimen: no sig /New regimen: 90 mg QID) ? insulin NPH (NOVOLIN N NPH U-100 INSULIN) 100 unit/mL injection (Old regimen: no sig /New regimen: 15 units SC Q12H) ? insulin lispro (HUMALOG) 100 unit/mL injection (Old regimen: 2-8 units SC /New regimen: 2-8 units SC QID(ACHS)) ? levETIRAcetam (KEPPRA) 100 mg/mL solution (Old regimen: 10 mg/kg BID /New regimen: 750 mg Q12H) ? predniSONE (DELTASONE) 10 mg tablet (Old regimen: daily /New regimen: 10 mg daily for 5 days, then take 5 mg daily for 5 days) ? sertraline (ZOLOFT) 50 mg tablet (Old regimen: 50 mg daily /New regimen: 100 mg daily) ? warfarin (COUMADIN) (Old regimen: 6 mg daily /New regimen: 7.5 mg daily) 4) Pertinent Pharmacy Findings: 
? Updated patients preferred outpatient pharmacy to: [facility] did not update the outpatient pharmacy 
? nystatin (MYCOSTATIN) 100,000 unit/mL suspension: Patient started a 7 day regimen on 2/16/19. Patient has completed 1 day of therapy as of 2/17/19. ? predniSONE (DELTASONE) 10 mg tablet: Patient started a taper on 2/16/19. Patient is to take 10 mg for 5 days, then taper down to 5 mg for 5 days. Patient has completed 2 days of therapy of the 10 mg regimen as of 2/17/19. ? acetaminophen (TYLENOL) 325 mg tablet, albuterol-ipratropium (DUO-NEB) 2.5 mg-0.5 mg/3 ml nebu, guaiFENesin (ROBITUSSIN) 100 mg/5 mL liquid, and traMADol (ULTRAM) 50 mg tablet: Per [facility], these PRn agents have not been administered as of 19. 
? Identified High Alert Medication Information 
o Current Anticoagulants: 
? Name: warfarin (COUMADIN) 7.5 mg tablet PTA medication list was corrected to the following:  
 
Prior to Admission Medications Prescriptions Last Dose Informant Patient Reported? Taking?  
acetaminophen (TYLENOL) 325 mg tablet Not Taking at Unknown time Transfer Papers Yes No  
Si mg by Per G Tube route every three (3) hours as needed for Pain. albuterol-ipratropium (DUO-NEB) 2.5 mg-0.5 mg/3 ml nebu Not Taking at Unknown time Transfer Papers Yes No  
Sig: 3 mL by Nebulization route every three (3) hours as needed. atorvastatin (LIPITOR) 80 mg tablet 2019 at 7 Transfer Papers Yes Yes Si mg by Per G Tube route nightly. baclofen (LIORESAL) 10 mg tablet 2019 at 2137 Transfer Papers Yes Yes Si mg by Per G Tube route two (2) times a day. dilTIAZem (CARDIZEM) 30 mg tablet 2019 at 3022 Fiber Options Transfer Papers Yes Yes Si mg by Per G Tube route four (4) times daily. guaiFENesin (ROBITUSSIN) 100 mg/5 mL liquid Not Taking at Unknown time Transfer Papers Yes No  
Sig: Take 200 mg by mouth every six (6) hours as needed for Cough. insulin NPH (NOVOLIN N NPH U-100 INSULIN) 100 unit/mL injection 2019 at 2137 Transfer Papers Yes Yes Sig: 15 Units by SubCUTAneous route every twelve (12) hours. insulin lispro (HUMALOG) 100 unit/mL injection 2019 at 1721 Transfer Papers Yes Yes Si-8 Units by SubCUTAneous route Before breakfast, lunch, dinner and at bedtime. Blood Glucose (mg/dL)       Insulin Dose (units).  
            <60                                Call MD 
            150-200                               0  
 201-250                               2  
            251-300                               4  
            301-350                               6  
            351-400                               8  
            >401                               Call MD  
levETIRAcetam (KEPPRA) 100 mg/mL solution 2019 at 2137 Transfer Papers Yes Yes Si mg by Per G Tube route every twelve (12) hours. nystatin (MYCOSTATIN) 100,000 unit/mL suspension 2019 at 2137 Transfer Papers Yes Yes Sig: Take 1 tsp by mouth four (4) times daily. swish and spit   
pantoprazole (PROTONIX) 40 mg granules for oral suspension 2019 at [address] [place] [address] Transfer Papers Yes Yes Si mg by Per G Tube route Daily (before breakfast). predniSONE (DELTASONE) 10 mg tablet 2019 at [address] [place] [address] Transfer Papers Yes Yes Si-10 mg by Per G Tube route Follow dosing instructions. Tale 10 mg daily for 5 days, then take 5 mg daily for 5 days  
sertraline (ZOLOFT) 50 mg tablet 2019 at 0915 Transfer Papers Yes Yes Si mg by Per G Tube route daily. traMADol (ULTRAM) 50 mg tablet Not Taking at Unknown time Transfer Papers Yes No  
Si-50 mg by Per G Tube route every four (4) hours as needed for Pain.  
warfarin (COUMADIN) 7.5 mg tablet 2019 at 1721 Transfer Papers Yes Yes Sig: Take 6 mg by mouth every evening. Facility-Administered Medications: None Thank you, 
Nimo Blandon CPhT Medication History Pharmacy Technician

## 2019-02-18 NOTE — Clinical Note
Sheath #all: Dressed using gauze and transparent dressing. Site: clean, dry, & intact, no bleeding and no hematoma.

## 2019-02-18 NOTE — Clinical Note
Sheath #1: sheath exchanged for INTRO [facility] CRV [facility] [id number] -- . Hemostasis achieved.  8fr sheath RFV removed/ SEPT advanced over package wire/ aspirated and flushed

## 2019-02-18 NOTE — ED NOTES
Spoke with patient spouse with 2 RN consent obtained. Dr. Gladys Hoffman to also speak with patient wife.

## 2019-02-18 NOTE — PROGRESS NOTES
Bedside and Verbal shift change report given to [address] [place] [address] (oncoming nurse) by Telma Lezama (offgoing nurse). Report included the following information SBAR, Kardex, Intake/Output, MAR, Accordion and Recent Results.

## 2019-02-18 NOTE — Clinical Note
TRANSFER - OUT REPORT:  
 
Verbal report given to: Ritchie Lacey. Report consisted of patient's Situation, Background, Assessment and  
Recommendations(SBAR). Opportunity for questions and clarification was provided. Patient transported with a Registered Nurse. Patient transported to: IVCU bed 2152.

## 2019-02-18 NOTE — ED NOTES
Pt arrived to ED with [facility] with RN. RN reports heart rate in the 140s. [place]/condom cath in place

## 2019-02-18 NOTE — CONSULTS
s              [address], [place], [address] [facility]  [phone]        Date of  Admission: 2/18/2019 10:02 AM     Admission type:Emergency    Consult for: atrial fib/flutter  Consult by: Dr Veena Hannah NP     Subjective:     Jose Alejandro Johnson is a 46 y.o. male brought over from [facility] with increased heart rates. PMHx significant for CVA (with aphasia), HTN, seizures, A-fib, DM and CKD. Who presented from [address] [place] [address] with elevated HR noted per RN. He is without complaints at this time. Patient Active Problem List    Diagnosis Date Noted    Atrial flutter ([address] 02/18/2019    CVA, old, aphasia 02/18/2019      None  Past Medical History:   Diagnosis Date    A-fib ([address]     Anemia     Chronic kidney disease     CVA (cerebrovascular accident) ([address]     BILATERAL BASAL GANGLIA    Diabetes ([address]     INSULIN DEPENDANT    Gastrointestinal disorder     GERD/GASTRIC ULCERS    Hypertension     Psychiatric disorder     SCHIZOAFFECTIVE    Seizures ([address]       Social History     Socioeconomic History    Marital status:      Spouse name: Not on file    Number of children: Not on file    Years of education: Not on file    Highest education level: Not on file     Allergies   Allergen Reactions    Shellfish Derived Unable to Obtain      No family history on file. Current Facility-Administered Medications   Medication Dose Route Frequency    sodium chloride 0.9 % bolus infusion 1,000 mL  1,000 mL IntraVENous NOW    dilTIAZem (CARDIZEM) 125 mg in dextrose 5% 125 mL infusion  10 mg/hr IntraVENous TITRATE     Current Outpatient Medications   Medication Sig    insulin lispro (HUMALOG) 100 unit/mL injection 2-8 Units by SubCUTAneous route.  insulin NPH (NOVOLIN N NPH U-100 INSULIN) 100 unit/mL injection by SubCUTAneous route once.  atorvastatin (LIPITOR) 80 mg tablet Take 80 mg by mouth daily.     baclofen (LIORESAL) 10 mg tablet 5 mg by Per G Tube route three (3) times daily.    dilTIAZem (CARDIZEM) 30 mg tablet by Per G Tube route.  levETIRAcetam (KEPPRA) 100 mg/mL solution 10 mg/kg by Per G Tube route two (2) times a day.  nystatin (MYCOSTATIN) 100,000 unit/mL suspension by Per G Tube route two (2) times a day. swish and spit    pantoprazole (PROTONIX) 40 mg granules for oral suspension 40 mg by Per G Tube route daily.  predniSONE (DELTASONE) 10 mg tablet 10 mg by Per G Tube route daily (with breakfast).  predniSONE (DELTASONE) 5 mg tablet 5 mg by Per G Tube route daily.  sertraline (ZOLOFT) 50 mg tablet 50 mg by Per G Tube route daily.  warfarin (COUMADIN) 2 mg tablet 6 mg by Per G Tube route daily.  acetaminophen (TYLENOL) 325 mg tablet 650 mg by Per G Tube route every three (3) hours as needed for Pain.  albuterol-ipratropium (DUO-NEB) 2.5 mg-0.5 mg/3 ml nebu 3 mL by Nebulization route every three (3) hours as needed.  guaiFENesin (ROBITUSSIN) 100 mg/5 mL liquid Take 200 mg by mouth three (3) times daily as needed for Cough.  traMADol (ULTRAM) 50 mg tablet 50 mg by Per G Tube route every six (6) hours as needed for Pain. Review of Symptoms:  Constitutional: negative  Eyes: negative  Ears, nose, mouth, throat, and face: negative  Respiratory: negative  Cardiovascular: + palpitations. Gastrointestinal: negative  Genitourinary:negative  Musculoskeletal:negative  Neurological: negative  Endocrine: negative     Subjective:      Visit Vitals  /75   Pulse (!) 141   Temp 98.1 °F (36.7 °C)   Resp 16   SpO2 100%       Physical:  Heart: tachycardic, Regular, S1 WNL and S2 WNL.  No S3 or S4, no m/S3/JVD, no carotid bruits   Lungs: clear   Abdomen: Soft, +BS, NTND   Extremities: LE maria [place] rosario +DP/PT, no edema   Neurologic: Grossly normal    Data Review:   Recent Labs     02/18/19  1017   WBC 12.3*   HGB 10.3*   HCT 30.9*        Recent Labs     02/18/19  1017 02/18/19  0445 02/16/19  0300    139 140   K 4.4 4.2 4.2    104 105   CO2 30 29 29   * 161* 161*   BUN 26* 25* 25*   CREA 1.34* 1.20 1.58*   CA 8.5 8.4* 8.0*   MG 2.3  --   --    ALB 2.6*  --   --    TBILI 0.2  --   --    SGOT 14*  --   --    ALT 39  --   --    INR 1.7*  --   --        Recent Labs     02/18/19  1017   TROIQ <0.05   CPK 23*       No intake or output data in the 24 hours ending 02/18/19 1106     Cardiographics    Telemetry: atrial flutter, 142        Assessment:            Active Problems:    Atrial flutter ([address] (2/18/2019)      CVA, old, aphasia (2/18/2019)         Plan:     Wilbert Lockwood is a 46year old male with  PMHx significant for CVA  (with aphasia), HTN, seizures, A-fib, DM and CKD who presented to ED with atrial flutter. He has been on warfarin however, there are no documented INRs in the chart. He is a candidate for CAROLINE/AFL ablation. I discussed the risks/benefits/alternatives of the procedure with the patient. Risks include (but are not limited to) bleeding, infection, cva/mi/death. The patient understands and would like to proceed. Thank you for this interesting consultation. Lashon Mccoy ANP    Patient seen and examined by me with nurse practitioner. I personally performed all components of the history, physical, and medical decision making and agree with the assessment and plan with minor modifications as noted. Pt with new dx of atrial flutter on coumadin. If inr therapeutic, then candidate for afl abl. If not, then will need caroline. I discussed the risks/benefits/alternatives of the procedure with the patient. Risks include (but are not limited to) bleeding, heart block, infection, cva/mi/tamponade/death. The patient understands and agrees to proceed. Thank you for this interesting consultation.       Patricia Sabillon MD, Gerda Clubs

## 2019-02-18 NOTE — PROGRESS NOTES
1845: spoke to Dr. Olamide Goss about tube tube feed for pt. He referred me to the DrAvs in the ER since pt came through the ER today. 1849: spoke to Dr. Arti Ruiz in ER about tube feed for pt. She said she would try and obtain the information necessary to decide on an appropriate tube feed for pt.  If she could not find it she will put in a dietary consult for tomorrow AM

## 2019-02-18 NOTE — ED PROVIDER NOTES
EMERGENCY DEPARTMENT HISTORY AND PHYSICAL EXAM 
 
 
Date: 2/18/2019 Patient Name: Michaela Knutson History of Presenting Illness Chief Complaint Patient presents with  Irregular Heart Beat Arrived from [address] [place] [address] with RN who reports having heart rate in the 140s this am.  
 
 
History Provided By: Patient HPI: Michaela Knutson, 46 y.o. male with PMHx significant for CVA, HTN, seizures, A-fib, DM and CKD presents via EMS to the ED with cc of sudden onset, constant palpitations since earlier this morning with associated decreased BP and left side weakness. Palpitations is described as a racing sensation. Weakness is secondary to past stroke and is at baseline. Pt is a transfer from [address] [place] [address] after the RN noticed his HR was in the 140s. He has been at [address] [place] [address] since February 14th. He is not normally on O2 at home. Pt is on Coumadin. Pt is a current smoker but denies illicit drug use. Pt specifically denies CP, SOB, a fever, chills or lightheadedness. There are no other complaints, changes, or physical findings at this time. PCP: None No current facility-administered medications on file prior to encounter. Current Outpatient Medications on File Prior to Encounter Medication Sig Dispense Refill  insulin lispro (HUMALOG) 100 unit/mL injection 2-8 Units by SubCUTAneous route Before breakfast, lunch, dinner and at bedtime. Blood Glucose (mg/dL)       Insulin Dose (units).  
            <60                                Call MD 
            150-200                               0  
            201-250                               2  
            251-300                               4  
            301-350                               6  
            351-400                               8  
            >401                               Call MD    
 insulin NPH (NOVOLIN N NPH U-100 INSULIN) 100 unit/mL injection 15 Units by SubCUTAneous route every twelve (12) hours.  atorvastatin (LIPITOR) 80 mg tablet 80 mg by Per G Tube route nightly.  baclofen (LIORESAL) 10 mg tablet 5 mg by Per G Tube route two (2) times a day.  dilTIAZem (CARDIZEM) 30 mg tablet 90 mg by Per G Tube route four (4) times daily.  levETIRAcetam (KEPPRA) 100 mg/mL solution 750 mg by Per G Tube route every twelve (12) hours.  nystatin (MYCOSTATIN) 100,000 unit/mL suspension Take 1 tsp by mouth four (4) times daily. swish and spit  pantoprazole (PROTONIX) 40 mg granules for oral suspension 40 mg by Per G Tube route Daily (before breakfast).  predniSONE (DELTASONE) 10 mg tablet 5-10 mg by Per G Tube route Follow dosing instructions. Tale 10 mg daily for 5 days, then take 5 mg daily for 5 days  sertraline (ZOLOFT) 50 mg tablet 100 mg by Per G Tube route daily.  warfarin (COUMADIN) 7.5 mg tablet Take 6 mg by mouth every evening.  acetaminophen (TYLENOL) 325 mg tablet 650 mg by Per G Tube route every three (3) hours as needed for Pain.  albuterol-ipratropium (DUO-NEB) 2.5 mg-0.5 mg/3 ml nebu 3 mL by Nebulization route every three (3) hours as needed.  guaiFENesin (ROBITUSSIN) 100 mg/5 mL liquid Take 200 mg by mouth every six (6) hours as needed for Cough.  traMADol (ULTRAM) 50 mg tablet 25-50 mg by Per G Tube route every four (4) hours as needed for Pain. Past History Past Medical History: 
Past Medical History:  
Diagnosis Date  A-[facility] ([facility] [address]  Anemia  Chronic kidney disease  CVA (cerebrovascular accident) ([facility] [address] BILATERAL BASAL GANGLIA  Diabetes ([facility] [address] INSULIN DEPENDANT  Gastrointestinal disorder GERD/GASTRIC ULCERS  
 Hypertension  Psychiatric disorder SCHIZOAFFECTIVE  Seizures ([facility] [address] Past Surgical History: 
Past Surgical History:  
Procedure Laterality Date  ABDOMEN SURGERY PROC UNLISTED    
 PEG PLACEMENT Family History: No family history on file. Social History: 
Social History Tobacco Use  Smoking status: Not on file Substance Use Topics  Alcohol use: Not on file  Drug use: Not on file Allergies: Allergies Allergen Reactions  Shellfish Derived Unable to Obtain Review of Systems Review of Systems Constitutional: Negative for fever. HENT: Negative for congestion. Eyes: Negative. Respiratory: Negative for shortness of breath. Cardiovascular: Positive for palpitations. +low BP Gastrointestinal: Negative for abdominal pain. Endocrine: Negative for heat intolerance. Genitourinary: Negative. Musculoskeletal: Negative for back pain. Skin: Negative for rash. Allergic/Immunologic: Negative for immunocompromised state. Neurological: Positive for weakness. Hematological: Does not bruise/bleed easily. Psychiatric/Behavioral: Negative. All other systems reviewed and are negative. Physical Exam  
Physical Exam  
Constitutional: He is oriented to person, place, and time. He appears well-developed and well-nourished. No distress. PEG tube present HENT:  
Head: Normocephalic and atraumatic. Eyes: EOM are normal. Pupils are equal, round, and reactive to light. Neck: Normal range of motion. Neck supple. Cardiovascular: Regular rhythm and normal heart sounds. Tachycardia present. Pulmonary/Chest: Effort normal and breath sounds normal. He has no wheezes. Abdominal: Soft. Bowel sounds are normal. There is no tenderness. Musculoskeletal: Normal range of motion. He exhibits no edema or tenderness. Neurological: He is alert and oriented to person, place, and time. No cranial nerve deficit. Baseline left sided weakness Skin: Skin is warm and dry. Psychiatric: He has a normal mood and affect. His behavior is normal.  
Nursing note and vitals reviewed. Diagnostic Study Results Labs - Recent Results (from the past 12 hour(s)) METABOLIC PANEL, BASIC Collection Time: 02/18/19  4:45 AM  
Result Value Ref Range Sodium 139 136 - 145 mmol/L Potassium 4.2 3.5 - 5.1 mmol/L Chloride 104 97 - 108 mmol/L  
 CO2 29 21 - 32 mmol/L Anion gap 6 5 - 15 mmol/L Glucose 161 (H) 65 - 100 mg/dL BUN 25 (H) 6 - 20 MG/DL Creatinine 1.20 0.70 - 1.30 MG/DL  
 BUN/Creatinine ratio 21 (H) 12 - 20 GFR est AA >60 >60 ml/min/1.73m2 GFR est non-AA >60 >60 ml/min/1.73m2 Calcium 8.4 (L) 8.5 - 10.1 MG/DL  
EKG, 12 LEAD, INITIAL Collection Time: 02/18/19  9:53 AM  
Result Value Ref Range Ventricular Rate 123 BPM  
 Atrial Rate 294 BPM  
 QRS Duration 82 ms Q-T Interval 264 ms QTC Calculation (Bezet) 377 ms Calculated R Axis 12 degrees Calculated T Axis -155 degrees Diagnosis ** Poor data quality, interpretation may be adversely affected Atrial flutter with variable AV block with premature ventricular or  
aberrantly conducted complexes T wave abnormality, consider inferior ischemia T wave abnormality, consider anterolateral ischemia No previous ECGs available CBC WITH AUTOMATED DIFF Collection Time: 02/18/19 10:17 AM  
Result Value Ref Range WBC 12.3 (H) 4.1 - 11.1 K/uL  
 RBC 3.30 (L) 4.10 - 5.70 M/uL  
 HGB 10.3 (L) 12.1 - 17.0 g/dL HCT 30.9 (L) 36.6 - 50.3 % MCV 93.6 80.0 - 99.0 FL  
 MCH 31.2 26.0 - 34.0 PG  
 MCHC 33.3 30.0 - 36.5 g/dL  
 RDW 14.6 (H) 11.5 - 14.5 % PLATELET 947 959 - 776 K/uL MPV 9.3 8.9 - 12.9 FL  
 NRBC 0.0 0  WBC ABSOLUTE NRBC 0.00 0.00 - 0.01 K/uL NEUTROPHILS 60 32 - 75 % LYMPHOCYTES 29 12 - 49 % MONOCYTES 10 5 - 13 % EOSINOPHILS 1 0 - 7 % BASOPHILS 1 0 - 1 % IMMATURE GRANULOCYTES 0 0.0 - 0.5 % ABS. NEUTROPHILS 7.4 1.8 - 8.0 K/UL  
 ABS. LYMPHOCYTES 3.6 (H) 0.8 - 3.5 K/UL  
 ABS. MONOCYTES 1.3 (H) 0.0 - 1.0 K/UL  
 ABS. EOSINOPHILS 0.1 0.0 - 0.4 K/UL  
 ABS. BASOPHILS 0.1 0.0 - 0.1 K/UL  
 ABS. IMM. GRANS. 0.0 0.00 - 0.04 K/UL DF AUTOMATED PROTHROMBIN TIME + INR Collection Time: 02/18/19 10:17 AM  
Result Value Ref Range INR 1.7 (H) 0.9 - 1.1 Prothrombin time 16.6 (H) 9.0 - 11.1 sec METABOLIC PANEL, COMPREHENSIVE Collection Time: 02/18/19 10:17 AM  
Result Value Ref Range Sodium 140 136 - 145 mmol/L Potassium 4.4 3.5 - 5.1 mmol/L Chloride 104 97 - 108 mmol/L  
 CO2 30 21 - 32 mmol/L Anion gap 6 5 - 15 mmol/L Glucose 194 (H) 65 - 100 mg/dL BUN 26 (H) 6 - 20 MG/DL Creatinine 1.34 (H) 0.70 - 1.30 MG/DL  
 BUN/Creatinine ratio 19 12 - 20 GFR est AA >60 >60 ml/min/1.73m2 GFR est non-AA 56 (L) >60 ml/min/1.73m2 Calcium 8.5 8.5 - 10.1 MG/DL Bilirubin, total 0.2 0.2 - 1.0 MG/DL  
 ALT (SGPT) 39 12 - 78 U/L  
 AST (SGOT) 14 (L) 15 - 37 U/L Alk. phosphatase 105 45 - 117 U/L Protein, total 6.1 (L) 6.4 - 8.2 g/dL Albumin 2.6 (L) 3.5 - 5.0 g/dL Globulin 3.5 2.0 - 4.0 g/dL A-G Ratio 0.7 (L) 1.1 - 2.2 MAGNESIUM Collection Time: 02/18/19 10:17 AM  
Result Value Ref Range Magnesium 2.3 1.6 - 2.4 mg/dL CK Collection Time: 02/18/19 10:17 AM  
Result Value Ref Range CK 23 (L) 39 - 308 U/L  
TROPONIN I Collection Time: 02/18/19 10:17 AM  
Result Value Ref Range Troponin-I, Qt. <0.05 <0.05 ng/mL GLUCOSE, POC Collection Time: 02/18/19  1:18 PM  
Result Value Ref Range Glucose (POC) 218 (H) 65 - 100 mg/dL Performed by [facility] Radiologic Studies -  
XR CHEST PORT Final Result Impression: No acute process. CXR Results  (Last 48 hours) 02/18/19 1113  XR CHEST PORT Final result Impression:  Impression: No acute process. Narrative: Indication: Atrial flutter Comparison: None Portable exam of the chest obtained at 11:00 AM demonstrates normal heart size. There is no acute process in the lung fields. The osseous structures are  
unremarkable. Medical Decision Making I am the first provider for this patient. I reviewed the vital signs, available nursing notes, past medical history, past surgical history, family history and social history. Vital Signs-Reviewed the patient's vital signs. Patient Vitals for the past 12 hrs: 
 Temp Pulse Resp BP SpO2  
02/18/19 1330    (!) 123/97   
02/18/19 1316  95   99 % 02/18/19 1300 97.7 °F (36.5 °C) 94 16 124/90 97 % 02/18/19 1100  (!) 140 13 (!) 114/94 93 % 02/18/19 1045  (!) 143 12 118/84 99 % 02/18/19 1038  (!) 141 16 110/75 100 % 02/18/19 1008 98.1 °F (36.7 °C) (!) 126 12 (!) 75/64 100 % Pulse Oximetry Analysis - 100% on 2L NC Cardiac Monitor:  
Rate: 126 bpm 
Rhythm: Sinus Tachycardia EKG interpretation: 8419 Rhythm: atrial flutter with variable AV block; and regular . Rate (approx.): 126; Axis: normal; QRS interval: normal ; ST/T wave: normal. 
This note was written by Zulma Ogden, ED scribe, as dictated by Debra Cruz MD. Records Reviewed: Nursing Notes and Old Medical Records Provider Notes (Medical Decision Making): DDx: A flutter, RVR, dehydration, CAD, electrolyte abnormality, infection ED Course:  
Initial assessment performed. The patients presenting problems have been discussed, and they are in agreement with the care plan formulated and outlined with them. I have encouraged them to ask questions as they arise throughout their visit. Consult Note: 
10:51 AM 
Debra Cruz MD spoke with Dr. Madhavi Tapia, Specialty: Cardiology Discussed pt's hx, disposition, and available diagnostic and imaging results. Reviewed care plans. Consultant agrees with plans as outlined. He recommends admission to hospitalist, starting on a Cardizem, and will consent for an ablation. CONSULT NOTE:  
11:05 AM 
Debra Cruz MD spoke with Dr. Shannon Caban, Specialty: Hospitalist 
Discussed pt's hx, disposition, and available diagnostic and imaging results. Reviewed care plans. Consultant will evaluate pt for admission. Written by Farzad Byrne, ED Scribe, as dictated by Debra Cruz MD. 
 
 
CRITICAL CARE NOTE : 
1:43 PM 
 
IMPENDING DETERIORATION -Respiratory, Cardiovascular and Metabolic ASSOCIATED RISK FACTORS - Hypotension, Hypoxia, Dysrhythmia and Metabolic changes MANAGEMENT- Bedside Assessment and Supervision of Care INTERPRETATION -  Xrays, ECG and Blood Pressure INTERVENTIONS - hemodynamic mngmt and Metobolic interventions CASE REVIEW - Hospitalist, Medical Sub-Specialist and Nursing TREATMENT RESPONSE -Improved PERFORMED BY - Self NOTES   : 
I have spent 55 minutes of critical care time involved in lab review, consultations with specialist, family decision- making, bedside attention and documentation. During this entire length of time I was immediately available to the patient . Debra Cruz MD 
 
Disposition: 
Admit Note: 
11:06 AM 
Pt is being admitted by Dr. Shannon Caban. The results of their tests and reason(s) for their admission have been discussed with pt and/or available family. They convey agreement and understanding for the need to be admitted and for admission diagnosis. PLAN: 
1. Admit to hospitalist 
 
Diagnosis Clinical Impression: 1. Atrial flutter with rapid ventricular response ([address] 2. Atrial flutter, unspecified type ([address] 3. Hypotension, unspecified hypotension type 4. CVA, old, aphasia Attestations: This note is prepared by Zulma Ogden, acting as Scribe for Debra Cruz MD. 
 
Debra Cruz MD: The scribe's documentation has been prepared under my direction and personally reviewed by me in its entirety. I confirm that the note above accurately reflects all work, treatment, procedures, and medical decision making performed by me.

## 2019-02-18 NOTE — ED NOTES
Attempted to speak with the pt's spouse to obtain consent for Atrial ablation with DARYN per Yossi's request, left a message to call back; will re attempt another call;

## 2019-02-18 NOTE — Clinical Note
TRANSFER - IN REPORT:  
 
Verbal report received from: ER staff. Report consisted of patient's Situation, Background, Assessment and  
Recommendations(SBAR). Opportunity for questions and clarification was provided. Assessment completed upon patient's arrival to unit and care assumed. Patient transported with a Registered Nurse.

## 2019-02-19 VITALS
WEIGHT: 144.1 LBS | HEART RATE: 88 BPM | TEMPERATURE: 97 F | RESPIRATION RATE: 12 BRPM | OXYGEN SATURATION: 94 % | DIASTOLIC BLOOD PRESSURE: 98 MMHG | SYSTOLIC BLOOD PRESSURE: 152 MMHG

## 2019-02-19 LAB
ERYTHROCYTE [DISTWIDTH] IN BLOOD BY AUTOMATED COUNT: 14.8 % (ref 11.5–14.5)
GLUCOSE BLD STRIP.AUTO-MCNC: 123 MG/DL (ref 65–100)
GLUCOSE BLD STRIP.AUTO-MCNC: 154 MG/DL (ref 65–100)
HCT VFR BLD AUTO: 28.8 % (ref 36.6–50.3)
HGB BLD-MCNC: 9.6 G/DL (ref 12.1–17)
INR PPP: 1.8 (ref 0.9–1.1)
MCH RBC QN AUTO: 31.3 PG (ref 26–34)
MCHC RBC AUTO-ENTMCNC: 33.3 G/DL (ref 30–36.5)
MCV RBC AUTO: 93.8 FL (ref 80–99)
NRBC # BLD: 0 K/UL (ref 0–0.01)
NRBC BLD-RTO: 0 PER 100 WBC
PLATELET # BLD AUTO: 345 K/UL (ref 150–400)
PMV BLD AUTO: 9.5 FL (ref 8.9–12.9)
PROTHROMBIN TIME: 17.9 SEC (ref 9–11.1)
RBC # BLD AUTO: 3.07 M/UL (ref 4.1–5.7)
SERVICE CMNT-IMP: ABNORMAL
SERVICE CMNT-IMP: ABNORMAL
WBC # BLD AUTO: 8.7 K/UL (ref 4.1–11.1)

## 2019-02-19 PROCEDURE — 97530 THERAPEUTIC ACTIVITIES: CPT

## 2019-02-19 PROCEDURE — 85610 PROTHROMBIN TIME: CPT

## 2019-02-19 PROCEDURE — 93005 ELECTROCARDIOGRAM TRACING: CPT

## 2019-02-19 PROCEDURE — 74011636637 HC RX REV CODE- 636/637: Performed by: FAMILY MEDICINE

## 2019-02-19 PROCEDURE — 82962 GLUCOSE BLOOD TEST: CPT

## 2019-02-19 PROCEDURE — 85027 COMPLETE CBC AUTOMATED: CPT

## 2019-02-19 PROCEDURE — 97165 OT EVAL LOW COMPLEX 30 MIN: CPT

## 2019-02-19 PROCEDURE — 74011250637 HC RX REV CODE- 250/637: Performed by: INTERNAL MEDICINE

## 2019-02-19 PROCEDURE — 74011250636 HC RX REV CODE- 250/636: Performed by: INTERNAL MEDICINE

## 2019-02-19 PROCEDURE — 74011250637 HC RX REV CODE- 250/637: Performed by: FAMILY MEDICINE

## 2019-02-19 PROCEDURE — 36415 COLL VENOUS BLD VENIPUNCTURE: CPT

## 2019-02-19 PROCEDURE — 97162 PT EVAL MOD COMPLEX 30 MIN: CPT

## 2019-02-19 RX ORDER — ENOXAPARIN SODIUM 100 MG/ML
60 INJECTION SUBCUTANEOUS EVERY 12 HOURS
Status: DISCONTINUED | OUTPATIENT
Start: 2019-02-19 | End: 2019-02-19 | Stop reason: HOSPADM

## 2019-02-19 RX ORDER — DILTIAZEM HYDROCHLORIDE 30 MG/1
30 TABLET, FILM COATED ORAL 4 TIMES DAILY
Qty: 120 TAB | Refills: 0 | Status: SHIPPED
Start: 2019-02-19

## 2019-02-19 RX ORDER — DILTIAZEM HYDROCHLORIDE 30 MG/1
90 TABLET, FILM COATED ORAL EVERY 6 HOURS
Status: DISCONTINUED | OUTPATIENT
Start: 2019-02-19 | End: 2019-02-19

## 2019-02-19 RX ORDER — ENOXAPARIN SODIUM 100 MG/ML
60 INJECTION SUBCUTANEOUS EVERY 12 HOURS
Qty: 1 SYRINGE | Refills: 0 | Status: SHIPPED | OUTPATIENT
Start: 2019-02-19

## 2019-02-19 RX ORDER — DILTIAZEM HYDROCHLORIDE 30 MG/1
30 TABLET, FILM COATED ORAL EVERY 6 HOURS
Status: DISCONTINUED | OUTPATIENT
Start: 2019-02-19 | End: 2019-02-19

## 2019-02-19 RX ORDER — DILTIAZEM HYDROCHLORIDE 30 MG/1
30 TABLET, FILM COATED ORAL EVERY 6 HOURS
Status: DISCONTINUED | OUTPATIENT
Start: 2019-02-19 | End: 2019-02-19 | Stop reason: HOSPADM

## 2019-02-19 RX ADMIN — LEVETIRACETAM 750 MG: 500 SOLUTION ORAL at 09:04

## 2019-02-19 RX ADMIN — SERTRALINE HYDROCHLORIDE 100 MG: 50 TABLET ORAL at 09:04

## 2019-02-19 RX ADMIN — ENOXAPARIN SODIUM 60 MG: 60 INJECTION, SOLUTION INTRAVENOUS; SUBCUTANEOUS at 09:06

## 2019-02-19 RX ADMIN — PREDNISONE 5 MG: 5 TABLET ORAL at 09:07

## 2019-02-19 RX ADMIN — BACLOFEN 5 MG: 10 TABLET ORAL at 11:36

## 2019-02-19 RX ADMIN — DILTIAZEM HYDROCHLORIDE 30 MG: 30 TABLET, FILM COATED ORAL at 13:38

## 2019-02-19 RX ADMIN — PANTOPRAZOLE SODIUM 40 MG: 40 TABLET, DELAYED RELEASE ORAL at 09:07

## 2019-02-19 RX ADMIN — INSULIN LISPRO 2 UNITS: 100 INJECTION, SOLUTION INTRAVENOUS; SUBCUTANEOUS at 02:00

## 2019-02-19 RX ADMIN — INSULIN HUMAN 5 UNITS: 100 INJECTION, SUSPENSION SUBCUTANEOUS at 09:05

## 2019-02-19 RX ADMIN — NYSTATIN 500000 UNITS: 100000 SUSPENSION ORAL at 13:38

## 2019-02-19 RX ADMIN — NYSTATIN 500000 UNITS: 100000 SUSPENSION ORAL at 09:04

## 2019-02-19 RX ADMIN — ATORVASTATIN CALCIUM 80 MG: 40 TABLET, FILM COATED ORAL at 09:06

## 2019-02-19 NOTE — PROGRESS NOTES
Pharmacy Daily Dosing of Warfarin Indication: A fib Goal INR: 2-3 PTA Warfarin Dose: 6 mg daily Concurrent anticoagulants: Enoxaparin 60 mg every 12 hours Concurrent antiplatelet: None Major Interacting Medications ? Drugs that may increase INR: None ? Drugs that may decrease INR: None Conditions that may increase/decrease INR (CHF, C. diff, cirrhosis, thyroid disorder, hypoalbuminemia): None Labs: 
Recent Labs  
  02/19/19 
0403 02/18/19 Jessica INR 1.8* 1.7* HGB 9.6* 10.3*  398 SGOT  --  14* TBILI  --  0.2 ALB  --  2.6* Impression/Plan: · Warfarin 6 mg PO x 1 dose. · Daily INR · CBC w/o diff QOD Pharmacy will follow daily and adjust the dose as appropriate. Thanks Jorge Cole, PHARMD 
 
 
[email or web address]. pdf

## 2019-02-19 NOTE — PROGRESS NOTES
Initial Nutrition Assessment: 
 
INTERVENTIONS/RECOMMENDATIONS:  
· Continue current TF order · Glucerna 1.2 @ 65 ml/hr + 200 ml water flush q 6 hrs · Provides: ~1700 kcals, 85 g protein and 1950 ml of free water · Keep HOB at least 30 degree at all times ASSESSMENT:  
Chart reviewed, medically noted for Atrial flutter, h/o of CVA with aphasia and PEG for nutrition, DM and PMH shown below. Nutrition consulted for TF management. Per Dr. John Donohue note, current TF order is what pt was receiving at [facility]. Current order meets pt estimated kcal, protein and hydration needs. Continue current order. Past Medical History:  
Diagnosis Date  A-fib ([facility] [address]  Anemia  Chronic kidney disease  CVA (cerebrovascular accident) ([facility] [address] BILATERAL BASAL GANGLIA  Diabetes ([facility] [address] INSULIN DEPENDANT  Gastrointestinal disorder GERD/GASTRIC ULCERS  
 Hypertension  Psychiatric disorder SCHIZOAFFECTIVE  Seizures ([facility] [address] Diet Order: (Glucerna 1.2 @ 65 ml/hr + 200 ml water flush q 6 hrs) % Eaten:  No data found. Pertinent Medications: [x]Reviewed: humalog, NPH, PPI, prednisone, warfarin Pertinent Labs: [x]Reviewed: B-154, HA1C 7.6 Food Allergies: []NKFA  [x]Other (shellfish) Last BM: PTA Edema:  n/a      []RUE   []LUE   []RLE   []LLE Pressure Injury:  n/a    [] Stage I   [] Stage II   [] Stage III   [] Stage IV Wt Readings from Last 30 Encounters:  
19 65.4 kg (144 lb 1.6 oz) Anthropometrics:  
Height:   Weight: 65.4 kg (144 lb 1.6 oz) IBW (%IBW):   ( ) UBW (%UBW):   (  %) Last Weight Metrics: 
Weight Loss Metrics 2019 Today's Wt 144 lb 1.6 oz  
 
 
BMI: There is no height or weight on file to calculate BMI. This BMI is indicative of: 
 []Underweight    []Normal    []Overweight    [] Obesity   [] Extreme Obesity (BMI>40) Estimated Nutrition Needs (Based on):  
9150-7057 Kcals/day (25-30 kcal/kg) , 65 g(1 g/kg) Protein Carbohydrate: At Least 130 g/day  Fluids: 1625 mL/day (1ml/kcal) or per primary team 
 
NUTRITION DIAGNOSES:  
Problem:  Swallowing difficulty Etiology: related to h/o CVA with aphasia Signs/Symptoms: as evidenced by PEG tube dependent NUTRITION INTERVENTIONS: 
  Enteral/Parenteral Nutrition: Other(continue current TF order) GOAL:  
meet 80% of ordered TF in 2-4 days LEARNING NEEDS (Diet, Food/Nutrient-Drug Interaction):  
 [x] None Identified 
 [] Identified and Education Provided/Documented 
 [] Identified and Pt declined/was not appropriate Cultureal, Methodist, OR Ethnic Dietary Needs:  
 [x] None Identified 
 [] Identified and Addressed 
 
 [x] Interdisciplinary Care Plan Reviewed/Documented  
 [x] Discharge Planning: Continue TF order MONITORING /EVALUATION:  
  
Food/Nutrient Intake Outcomes: Enteral/parenteral nutrition intake Physical Signs/Symptoms Outcomes: Weight/weight change, Electrolyte and renal profile, Glucose profile, GI 
 
NUTRITION RISK:  
 [] High              [x] Moderate           []  Low  []  Minimal/Uncompromised PT SEEN FOR:  
 [x]  MD Consult: []Calorie Count []Diabetic Diet Education []Diet Education []Electrolyte Management []General Nutrition Management and Supplements [x]Management of Tube Feeding []TPN Recommendations []  RN Referral:  []MST score >=2 
   []Enteral/Parenteral Nutrition PTA []Pregnant: Gestational DM or Multigestation 
   []Pressure Ulcer/Wound Care needs 
     
[]  Low BMI 
[]  LOS Referral  
 
 
Jared Fitzgerald RDN Pager [phone] Weekend Pager [phone]

## 2019-02-19 NOTE — PROGRESS NOTES
Cm acknowledged consult. Transfer back to [address] Referral submitted. 945am 
Galo Holt at [address] called to confirm receipt of referral.  Requested d/c date. CM reviewed with attending. Patient is medically stable and ready for discharge today. PTOT will see patient this morning. Galo Holt informed. CM left message on voicemail and updated via BusyEvent. 953am  
Patient has introduced self to patient. Patient has expressive aphasia. CM asked if I had permission to call wife to review demographics and discharge planning. Patient nodded head yes. CM called and left message on wife's voicemail. Galo Holt returned call. CM reviewed Cardiology note with Angie Rivas. She will inquire if patient needs to get a new auth to return to [address] Will keep us informed. 1043am  
Patient requires new auth for [address] Temo Mock RN CM Ext [phone]

## 2019-02-19 NOTE — PROGRESS NOTES
Pharmacy  Enoxaparin (Lovenox®) Monitoring Indication: A flutter Current Dose: Enoxaparin 70 mg subcutaneously every 12 hours Creatinine Clearance (mL/min): 47 mL/min Current Weight: 65.4 kg Labs: 
Recent Labs  
  02/19/19 
0403 02/18/19 [phone] 02/18/19 
0448 CREA  --  1.34* 1.20 HGB 9.6* 10.3*  --   
 398  --   
INR 1.8* 1.7*  -- Wt Readings from Last 1 Encounters:  
02/18/19 65.4 kg (144 lb 1.6 oz) Ht Readings from Last 1 Encounters:  
No data found for Ht Impression/Plan: · Will change to 60 mg every 12 hours for weight 56-65 kg per protocol. Thanks, Leopoldo Harder, PHARMD 
 
  [email or web address]%[email or web address]. pdf

## 2019-02-19 NOTE — DISCHARGE SUMMARY
Hospitalist Discharge Summary     Patient ID:  Alfonso Leal  [id number]  46 y.o.  1970    PCP on record: None    Admit date: 2/18/2019  Discharge date and time: 2/19/2019      DISCHARGE DIAGNOSIS:  Atrial flutter  Aphasia and left side paresis with h/o CVA  Diabetes mellitus type 2, controlled   Hyperlipidemia     CONSULTATIONS:  IP CONSULT TO CARDIOLOGY  IP CONSULT TO HOSPITALIST    Excerpted HPI from H&P of Leon Post MD:  H[age] Dangel[age] y. o. male with PMHx significant for CVA, HTN, seizures, A-fib, DM and CKD presents via EMS to the ED: he is S./P stroke with left side weakness and aphasia, he is in rehab where RN notice low BP and tachycardia 140/minutes, upon arrival he was hypotensive and in Aflutter with RVR. Given IVF,  started on  Cardizem drip, symptoms improved with intervention, cardiology consulted and they are about to take him to cardiac lab for ablation. Patient unable to provide information due to his aphasia. Responds to question  by nodding, he has been in [address] [place] [address] since 2/14. Pt specifically denies CP, SOB, a fever, chills or lightheadedness        We were asked to admit for work up and evaluation of the above problems. ______________________________________________________________________  DISCHARGE SUMMARY/HOSPITAL COURSE:  for full details see H&P, daily progress notes, labs, consult notes. Atrial flutter s/p ablation 2/18, now in sinus rhythm. Initially was on cardizem drip but no HR meds since yesterday. Presented hypotension which could be due to higher dose of cardizem vs symptomatic a.fib. Will restart cardizem at lower dose. Cardiology ok with the discharge back to [address] on [facility]. If BP stable then cardizem can be titrated up to prior dose  _______________________________________________________________________  Patient seen and examined by me on discharge day.   Pertinent Findings:  Gen:    Not in distress  Chest: Clear lungs  CVS:   Regular rhythm. No edema  Abd:  Soft, not distended, not tender  Neuro:  Alert, non verbal  _______________________________________________________________________  DISCHARGE MEDICATIONS:   Current Discharge Medication List      START taking these medications    Details   enoxaparin (LOVENOX) 60 mg/0.6 mL injection 60 mg by SubCUTAneous route every twelve (12) hours every twelve (12) hours. Until INR between 2-3  Qty: 1 Syringe, Refills: 0         CONTINUE these medications which have CHANGED    Details   dilTIAZem (CARDIZEM) 30 mg tablet 1 Tab by Per G Tube route four (4) times daily. Qty: 120 Tab, Refills: 0         CONTINUE these medications which have NOT CHANGED    Details   insulin lispro (HUMALOG) 100 unit/mL injection 2-8 Units by SubCUTAneous route Before breakfast, lunch, dinner and at bedtime. Blood Glucose (mg/dL)       Insulin Dose (units). <60                                Call MD              150-200                               0               201-250                               2               251-300                               4               301-350                               6               351-400                               8               >401                               Call MD      insulin NPH (NOVOLIN N NPH U-100 INSULIN) 100 unit/mL injection 15 Units by SubCUTAneous route every twelve (12) hours. atorvastatin (LIPITOR) 80 mg tablet 80 mg by Per G Tube route nightly. baclofen (LIORESAL) 10 mg tablet 5 mg by Per G Tube route two (2) times a day. levETIRAcetam (KEPPRA) 100 mg/mL solution 750 mg by Per G Tube route every twelve (12) hours. nystatin (MYCOSTATIN) 100,000 unit/mL suspension Take 1 tsp by mouth four (4) times daily. swish and spit       pantoprazole (PROTONIX) 40 mg granules for oral suspension 40 mg by Per G Tube route Daily (before breakfast).       predniSONE (DELTASONE) 10 mg tablet 5-10 mg by Per G Tube route Follow dosing instructions. Tale 10 mg daily for 5 days, then take 5 mg daily for 5 days      sertraline (ZOLOFT) 50 mg tablet 100 mg by Per G Tube route daily. warfarin (COUMADIN) 7.5 mg tablet Take 6 mg by mouth every evening. acetaminophen (TYLENOL) 325 mg tablet 650 mg by Per G Tube route every three (3) hours as needed for Pain. albuterol-ipratropium (DUO-NEB) 2.5 mg-0.5 mg/3 ml nebu 3 mL by Nebulization route every three (3) hours as needed. guaiFENesin (ROBITUSSIN) 100 mg/5 mL liquid Take 200 mg by mouth every six (6) hours as needed for Cough. traMADol (ULTRAM) 50 mg tablet 25-50 mg by Per G Tube route every four (4) hours as needed for Pain. My Recommended Diet, Activity, Wound Care, and follow-up labs are listed in the patient's Discharge Insturctions which I have personally completed and reviewed.     ______________________________________________________________________    Risk of deterioration: Moderate    Condition at Discharge:  Stable  ______________________________________________________________________    Disposition  IP Rehab  ______________________________________________________________________    Care Plan discussed with:   Patient, RN, Care Manager, Consultant    ______________________________________________________________________    Code Status: Full Code  ______________________________________________________________________      Follow up with:   PCP : None  Follow-up Information     Follow up With Specialties Details Why Nash Steele MD Cardiology, [address] [place] [address] Vascular Surgery, Internal Medicine Schedule an appointment as soon as possible for a visit or with your own cardiology after discharge from [address] [place] [address]  [phone]      Daily INR at [address] [place] [address]                  Total time in minutes spent coordinating this discharge (includes going over instructions, follow-up, prescriptions, and preparing report for sign off to her PCP) :  35 minutes    Signed:  Yenifer Pace MD

## 2019-02-19 NOTE — PROGRESS NOTES
Patient is to be discharged to [facility] today. Room is ready. RN to call report to:  [phone] Room Assgn 106 H&P and Facesheet on chart. ED [facility] staff will transport patient. [address] Nursing informed of discharge plan. CM informed pateint's wife of discharge plan. [address] Discharge order acknowledged. CM tasks are complete. Ekaterina Dias RN CM Ext [phone]

## 2019-02-19 NOTE — PROGRESS NOTES
Reason for Admission:   A flutter RRAT Score:    8 Plan for utilizing home health:      Plan is to readmit to [facility] Likelihood of Readmission: Mod to high Transition of Care Plan:       Plan is to readmit to [facility] 
 
CM met with patient. Patient has expressive aphasia. Nodded yes to give permission to CM to discuss demographics and discharge planning with wife. Cm spoke with Vin Novoa. New address effective March 1, 2019. [address] Same phone number Ground level apartment. No steps. Patient was originally transferred to [facility] from [facility] in [facility]. Admitted to [facility] on or about Jan 8, 2019. Discharged to [facility] on 2/15/2019. Rehab goal at [facility] was to work on leg strength, torso strength, eating and talking as reported by wife. Prior to admission at [facility] patient was not ambulating, w/c bound, transfer w assist and standing order NPO. Admitted here on 2/18/2019 for Aflutter. Discharge plans include return to [facility]. Wife has made arrangements to move to a new residence. 2BR, 1 bath apartment on ground level. Effective March 1, 2019. Patient previously went to the [facility]  for PCP care. Care Management Interventions PCP Verified by CM: Yes([facility]) Transition of Care Consult (CM Consult): Discharge Planning Discharge Durable Medical Equipment: No 
Physical Therapy Consult: Yes Occupational Therapy Consult: Yes Current Support Network: Lives with Spouse(effective March 1, 2019. ground level apartment. ) Plan discussed with Pt/Family/Caregiver: Yes Discharge Location Discharge Placement: Rehab hospital/unit acute Dee Coffman RN CM Ext [phone]

## 2019-02-19 NOTE — PROGRESS NOTES
Pharmacy Daily Dosing of Warfarin Indication: AF 
 
Goal INR: 2-3 PTA Warfarin Dose: 6 mg daily Concurrent anticoagulants: Enoxaparin 60 mg Q12H Concurrent antiplatelet:  
 
Major Interacting Medications ? Drugs that may increase INR:  
? Drugs that may decrease INR Conditions that may increase/decrease INR (CHF, C. diff, cirrhosis, thyroid disorder, hypoalbuminemia):  
 
Labs: 
Recent Labs  
  02/18/19 Jessica INR 1.7* HGB 10.3*  SGOT 14* TBILI 0.2 ALB 2.6* Impression/Plan: · Warfarin 6 mg PO x 1 dose. · Daily INR · CBC w/o diff QOD Pharmacy will follow daily and adjust the dose as appropriate. Thanks Ankita Hunt [facility] 
 
 
http://[facility]/[place]/[facility]/Pharmacy/Clinical%20Companion/Warfarin%20Dosing%20Protocol. pdf

## 2019-02-19 NOTE — PROGRESS NOTES
Problem: Falls - Risk of 
Goal: *Absence of Falls Document Senthil Pichardos Fall Risk and appropriate interventions in the flowsheet. Outcome: Progressing Towards Goal 
Fall Risk Interventions: 
  
 
  
 
Medication Interventions: Assess postural VS orthostatic hypotension, Evaluate medications/consider consulting pharmacy, Patient to call before getting OOB, Teach patient to arise slowly Elimination Interventions: Call light in reach, Patient to call for help with toileting needs

## 2019-02-19 NOTE — PROGRESS NOTES
Problem: Falls - Risk of 
Goal: *Absence of Falls Document Robina Miranda Fall Risk and appropriate interventions in the flowsheet. Outcome: Progressing Towards Goal 
Fall Risk Interventions: 
  
 
  
 
Medication Interventions: Assess postural VS orthostatic hypotension, Evaluate medications/consider consulting pharmacy, Patient to call before getting OOB, Teach patient to arise slowly Elimination Interventions: Call light in reach, Patient to call for help with toileting needs

## 2019-02-19 NOTE — PROGRESS NOTES
Problem: Mobility Impaired (Adult and Pediatric) Goal: *Acute Goals and Plan of Care (Insert Text) Physical Therapy Goals Initiated 2/19/2019 1. Patient will move from supine to sit and sit to supine , scoot up and down and roll side to side in bed with maximal assistance x 1 within 7 day(s). 2.  Patient will transfer from bed to chair and chair to bed with maximal assistance x 1 using the least restrictive device within 7 day(s). 3.  Patient will perform sit to stand with moderate assistance x 1  within 7 day(s). 4.  Patient will ambulate with maximal assistance x 2 for 10 feet with the least restrictive device within 7 day(s). 5.  Patient will improve Hopson Balance score by 7 points within 7 days. physical Therapy EVALUATION- neuro population Patient: Benita Mosqueda (46 y.o. male) Date: 2/19/2019 Primary Diagnosis: Atrial flutter ([address] [I48.92] Procedure(s) (LRB): ABLATION A-FLUTTER (N/A) Ep 3d Mapping (N/A) Lt Atrial Pace & Record During Ep Study (N/A) 1 Day Post-Op Precautions:  Fall ASSESSMENT : 
Based on the objective data described below, the patient presents with impaired functional mobility secondary to impaired sitting and standing balance, L-sided hemiparesis, impaired coordination, decreased motor control, impaired gait mechanics, expressive aphasia, increased fatigue, poor trunk control, and decreased activity tolerance following admission for atrial flutter. Pt underwent ablation on 2/18/19. Pt received supine in bed and agreeable to PT evaluation. Pt cleared by nursing for mobility. VSS on RA. No pain complaints. Pt admitted from [facility] (discharged to [facility] from [facility] following CVA with L-sided hemiparesis and expressive aphasia). Pt oriented x 3 (self, time, and situation, knew hospital). Pt with fair-good command following overall.  Needed max A x 2 for all bed mobility and mod A x 2 for sit<>stand transfers from EOB x 2 (mildly elevated bed height). Pt with poor ability to weight shift overall, needing max A x 2 to maintain standing and max-total A x 2 to weight shift to take short side steps towards HOB. Pt with poor eccentric control during stand>sit transfer x 2 at EOB, needing total A for safety. Pt was left supine in bed with all needs met, RN aware, and VSS following therapy session. Recommend pt return to [facility] to continue rehab and address impairments as noted above at discharge. PT will continue to follow to address mobility impairments as noted above. Patient will benefit from skilled intervention to address the above impairments. Patients rehabilitation potential is considered to be Fair Factors which may influence rehabilitation potential include:  
[]           None noted 
[]           Mental ability/status [x]           Medical condition 
[]           Home/family situation and support systems 
[]           Safety awareness 
[]           Pain tolerance/management 
[]           Other: PLAN : 
Recommendations and Planned Interventions: 
[x]             Bed Mobility Training             [x]      Neuromuscular Re-Education [x]             Transfer Training                   []      Orthotic/Prosthetic Training 
[x]             Gait Training                         []      Modalities [x]             Therapeutic Exercises           []      Edema Management/Control [x]             Therapeutic Activities            [x]      Patient and Family Training/Education []             Other (comment): Frequency/Duration: Patient will be followed by physical therapy 4 times a week to address goals. Discharge Recommendations: Inpatient Rehab Further Equipment Recommendations for Discharge: TBD by rehab SUBJECTIVE:  
Patient stated [place].  OBJECTIVE DATA SUMMARY:  
HISTORY:   
Past Medical History:  
Diagnosis Date  [facility]  Anemia  Chronic kidney disease  CVA (cerebrovascular accident) ([facility] BILATERAL BASAL GANGLIA  Diabetes ([facility] INSULIN DEPENDANT  Gastrointestinal disorder GERD/GASTRIC ULCERS  
 Hypertension  Psychiatric disorder SCHIZOAFFECTIVE  Seizures ([facility] Past Surgical History:  
Procedure Laterality Date  ABDOMEN SURGERY PROC UNLISTED    
 PEG PLACEMENT Prior Level of Function/Home Situation: Pt admitted from [facility]. Reports having been at [facility] ~ 2 days following hospital stay at [facility]. Lives with wife at baseline. Is independent for mobility and ADLs at baseline. Personal factors and/or comorbidities impacting plan of care: CVA; seizures; diabetes Home Situation Home Environment: Private residence Living Alone: No 
Support Systems: Spouse/Significant Other/Partner Patient Expects to be Discharged to[de-identified] Rehabilitation facilityEXAMINATION/PRESENTATION/DECISION MAKING:  
Critical Behavior: 
Neurologic State: Alert Orientation Level: Oriented to person, Oriented to time, Oriented to situation Cognition: Appropriate safety awareness, Follows commands Hearing: Auditory Auditory Impairment: NoneSkin:  Intact Edema: None Range Of Motion: 
AROM: Grossly decreased, non-functional(LUE/LLE, otherwise generally decreased) PROM: Generally decreased, functional 
  
  
  
Strength:   
Strength: Grossly decreased, non-functional(LUE/LLE, otherwise generally decreased) Tone & Sensation:  
Tone: Abnormal 
  
  
  
  
Sensation: Intact Coordination: 
Coordination: Grossly decreased, non-functional 
Vision:  
  
Functional Mobility: 
Bed Mobility: 
  
Supine to Sit: Maximum assistance;Assist x2 Sit to Supine: Maximum assistance;Assist x2 Scooting: Maximum assistance Transfers: 
Sit to Stand: Moderate assistance;Assist x2(x 2 from EOB) Stand to Sit: Moderate assistance;Maximum assistance;Assist x2(uncontrolled descent into sitting x 2) Bed to Chair: Total assistance Balance:  
Sitting: Impaired Sitting - Static: Poor (constant support) Sitting - Dynamic: Poor (constant support) Standing: Impaired; With support Standing - Static: Poor Standing - Dynamic : Poor Functional Measure Ramos Balance Test: 
 
Sitting to Standin Standing Unsupported: 0 Sitting with Back Unsupported: 0 Standing to Sittin Transfers: 0 Standing Unsupported with Eyes Closed: 0 Standing Unsupported with Feet Together: 0 Reach Forward with Outstretched Arm: 0  Object: 0 Turn to Look Over Shoulders: 0 Turn 360 Degrees: 0 Alternate Foot on Step/Stool: 0 Standing Unsupported One Foot in Front: 0 Stand on One Le Total: 0 
 
 
 
56=Maximum possible score;  
0-20=High fall risk 21-40=Moderate fall risk 41-56=Low fall risk Physical Therapy Evaluation Charge Determination History Examination Presentation Decision-Making HIGH Complexity :3+ comorbidities / personal factors will impact the outcome/ POC  HIGH Complexity : 4+ Standardized tests and measures addressing body structure, function, activity limitation and / or participation in recreation  MEDIUM Complexity : Evolving with changing characteristics  Other outcome measures RAMOS  HIGH Based on the above components, the patient evaluation is determined to be of the following complexity level: MEDIUM Pain: 
Pain Scale 1: Numeric (0 - 10) Pain Intensity 1: 0 Activity Tolerance:  
Fair - increased fatigue with activity; VSS on RA; no pain complaints Please refer to the flowsheet for vital signs taken during this treatment. After treatment:  
[]     Patient left in no apparent distress sitting up in chair 
[x]     Patient left in no apparent distress in bed 
[x]     Call bell left within reach [x]     Nursing notified 
[]     Caregiver present 
[]     Bed alarm activated COMMUNICATION/EDUCATION:  
The patients plan of care was discussed with: Physical Therapist, Occupational Therapist, Registered Nurse and . [x]  Fall prevention education was provided and the patient/caregiver indicated understanding. [x]  Patient/family have participated as able in goal setting and plan of care. [x]  Patient/family agree to work toward stated goals and plan of care. []  Patient understands intent and goals of therapy, but is neutral about his/her participation. []  Patient is unable to participate in goal setting and plan of care. Thank you for this referral. 
Omega Brantley, PT Time Calculation: 20 mins

## 2019-02-19 NOTE — PROGRESS NOTES
Problem: Patient Education: Go to Patient Education Activity Goal: Patient/Family Education Occupational Therapy Goals Initiated 2/19/2019 1. Patient will perform upper body dressing with minimal assistance/contact guard assist within 7 day(s). 2.  Patient will perform self-feeding with supervision/set-up within 7 day(s). 3.  Patient will perform grooming with supervision/set-up within 7 day(s). 4.  Patient will perform toilet transfers with moderate assistance  within 7 day(s). 5.  Patient will perform all aspects of toileting with moderate assistance  within 7 day(s). 6.  Patient will participate in upper extremity therapeutic exercise/activities for LUE neuro re-education with moderate assistance  for 10 minutes within 7 day(s). Occupational Therapy EVALUATION Patient: William Asif (46 y.o. male) Date: 2/19/2019 Primary Diagnosis: Atrial flutter ([address] [I48.92] Procedure(s) (LRB): ABLATION A-FLUTTER (N/A) Ep 3d Mapping (N/A) Lt Atrial Pace & Record During Ep Study (N/A) 1 Day Post-Op Precautions:   Fall ASSESSMENT : 
Based on the objective data described below, the patient presents with strength, balance, mobility and ADL deficits, and communicationimpairment secondary to recent CVA with L sided hemiparesis. Patient came from IRF due to afib and will be transferred back when medically stable. Needs Mod A for sitting balance and to stand and Mod -Max A of 2 to weight shift to side step Has multiple dficits from CVA that require OT, Pt, SLP intervention. Recommend continued therapy while in hospital and return to IRF. Patient will benefit from skilled intervention to address the above impairments. Patients rehabilitation potential is considered to be Good Factors which may influence rehabilitation potential include:  
[]             None noted [x]             Mental ability/status []             Medical condition []             Home/family situation and support systems [x]             Safety awareness []             Pain tolerance/management 
[]             Other: PLAN : 
Recommendations and Planned Interventions: 
[x]               Self Care Training                  [x]        Therapeutic Activities [x]               Functional Mobility Training    [x]        Cognitive Retraining 
[x]               Therapeutic Exercises           [x]        Endurance Activities [x]               Balance Training                   [x]        Neuromuscular Re-Education []               Visual/Perceptual Training     [x]   Home Safety Training 
[x]               Patient Education                 [x]        Family Training/Education []               Other (comment): Frequency/Duration: Patient will be followed by occupational therapy 4 times a week to address goals. Discharge Recommendations: Inpatient Rehab Further Equipment Recommendations for Discharge: to be determined SUBJECTIVE:  
Patient provided one word answers and mostly in whispers, but was able to communicate with therapists. OBJECTIVE DATA SUMMARY:  
HISTORY:  
Past Medical History:  
Diagnosis Date  A-fib ([facility]  Anemia  Chronic kidney disease  CVA (cerebrovascular accident) ([facility] [address] [facility] BILATERAL BASAL GANGLIA  Diabetes ([facility] [address] [facility] INSULIN DEPENDANT  Gastrointestinal disorder GERD/GASTRIC ULCERS  
 Hypertension  Psychiatric disorder SCHIZOAFFECTIVE  Seizures ([facility] [address] [facility] Past Surgical History:  
Procedure Laterality Date  ABDOMEN SURGERY PROC UNLISTED    
 PEG PLACEMENT  [place] EPHYS EVAL W/ABLATION SUPRAVENT ARRHYTHMIA N/A 2/18/2019 ABLATION A-FLUTTER performed by Donnie Shearer MD at [facility] Prior Level of Function/Environment/Context: Reports was independent prior to recent CVA Expanded or extensive additional review of patient history:  
 
Home Situation Home Environment: Private residence Living Alone: No 
 Support Systems: Spouse/Significant Other/Partner Patient Expects to be Discharged to[de-identified] Rehabilitation facility Hand dominance: RightEXAMINATION OF PERFORMANCE DEFICITS: 
Cognitive/Behavioral Status: 
Neurologic State: Alert Orientation Level: Oriented to person;Oriented to time;Oriented to situation Skin: intact Edema: none Hearing: Auditory Auditory Impairment: None Vision/Perceptual:   
Intact Range of Motion: 
 
AROM: Grossly decreased, non-functional(LUE/LLE, otherwise generally decreased) PROM: Generally decreased, functional 
  
  
  
  
  
  
Strength: 
 
Strength: Grossly decreased, non-functional(LUE/LLE, otherwise generally decreased) Coordination: 
Coordination: Grossly decreased, non-functional 
Fine Motor Skills-Upper: Left Impaired;Right Intact Gross Motor Skills-Upper: Left Impaired;Right Intact Tone & Sensation: 
 
Tone: Abnormal 
Sensation: Intact Balance: 
Sitting: Impaired Sitting - Static: Poor (constant support) Sitting - Dynamic: Poor (constant support) Standing: Impaired; With support Standing - Static: Poor Standing - Dynamic : Poor Functional Mobility and Transfers for ADLs:Bed Mobility: 
Supine to Sit: Maximum assistance;Assist x2 Sit to Supine: Maximum assistance;Assist x2 Scooting: Maximum assistance Transfers: 
Sit to Stand: Moderate assistance;Assist x2(x 2 from EOB) Stand to Sit: Moderate assistance;Maximum assistance;Assist x2(uncontrolled descent into sitting x 2) Bed to Chair: Total assistance ADL Assessment: 
Feeding: Minimum assistance Oral Facial Hygiene/Grooming: Minimum assistance Bathing: Maximum assistance Upper Body Dressing: Maximum assistance Lower Body Dressing: Total assistance Toileting: Total assistance ADL Intervention and task modifications: 
 
 
Functional Measure: 
Barthel Index: 
 
Bathin Bladder: 0 Bowels: 5 Groomin Dressin Feedin Mobility: 0 Stairs: 0 Toilet Use: 0 Transfer (Bed to Chair and Back): 5 Total: 15 The Barthel ADL Index: Guidelines 1. The index should be used as a record of what a patient does, not as a record of what a patient could do. 2. The main aim is to establish degree of independence from any help, physical or verbal, however minor and for whatever reason. 3. The need for supervision renders the patient not independent. 4. A patient's performance should be established using the best available evidence. Asking the patient, friends/relatives and nurses are the usual sources, but direct observation and common sense are also important. However direct testing is not needed. 5. Usually the patient's performance over the preceding 24-48 hours is important, but occasionally longer periods will be relevant. 6. Middle categories imply that the patient supplies over 50 per cent of the effort. 7. Use of aids to be independent is allowed. Tabitha Becerra., Barthel, D.W. (2041). Functional evaluation: the Barthel Index. [address] [facility] (14)2. Jose Guerrero kodak RAE Drummond, Philly Astorga., Margarita Garay., [place], [address] [place] [address] (1999). Measuring the change indisability after inpatient rehabilitation; comparison of the responsiveness of the Barthel Index and Functional [place] Measure. Journal of Neurology, Neurosurgery, and Psychiatry, 66(4), 474-316. Debby Abebe, N.J.A, CHRISTINE Trevizo, & Sindy Hendrix, M.A. (2004.) Assessment of post-stroke quality of life in cost-effectiveness studies: The usefulness of the Barthel Index and the EuroQoL-5D. [facility], 13, 936-98 Occupational Therapy Evaluation Charge Determination History Examination Decision-Making LOW Complexity : Brief history review  LOW Complexity : 1-3 performance deficits relating to physical, cognitive , or psychosocial skils that result in activity limitations and / or participation restrictions  LOW Complexity : No comorbidities that affect functional and no verbal or physical assistance needed to complete eval tasks Based on the above components, the patient evaluation is determined to be of the following complexity level: LOW Pain: 
Pain Scale 1: Numeric (0 - 10) Pain Intensity 1: 0 Activity Tolerance:  
 
Please refer to the flowsheet for vital signs taken during this treatment. After treatment:  
[] Patient left in no apparent distress sitting up in chair 
[x] Patient left in no apparent distress in bed 
[x] Call bell left within reach 
[] Nursing notified 
[] Caregiver present 
[] Bed alarm activated COMMUNICATION/EDUCATION:  
The patients plan of care was discussed with: Physical Therapist and Registered Nurse. [x] Home safety education was provided and the patient/caregiver indicated understanding. [] Patient/family have participated as able in goal setting and plan of care. [] Patient/family agree to work toward stated goals and plan of care. [] Patient understands intent and goals of therapy, but is neutral about his/her participation. [x] Patient is unable to participate in goal setting and plan of care. This patients plan of care is appropriate for delegation to [facility]. Thank you for this referral. 
Aylin Search Time Calculation: 20 mins

## 2019-02-19 NOTE — PROGRESS NOTES
[facility] to JENNIFER Franklin [address] 46 y.o.   male [address]   Room: 2152/01    [facility] [address] [facility]  Unit Phone# :  [phone] [facility] [address] [address] Dept: [phone] Loc: [id number] SITUATION Admitted:  2/18/2019         Attending Provider:  Srikanth Paz MD    
 
Consultations:  IP CONSULT TO CARDIOLOGY 
IP CONSULT TO HOSPITALIST 
 
PCP:  None   None Treatment Team: Attending Provider: Srikanth Paz MD; Consulting Provider: Agus Bustamante MD; Consulting Provider: Samaria Arguelles; Care Manager: Todd Matos RN Admitting Dx:  Atrial flutter ([facility] [address] [I48.92] Principal Problem: <principal problem not specified> 
 
1 Day Post-Op of  
Procedure(s): 
ABLATION A-FLUTTER Ep 3d Mapping Lt Atrial Pace & Record During Ep Study BY: Agus Bustamante MD             ON: 2/18/2019 Code Status: Full Code Advance Directives: No flowsheet data found. (Send w/patient) Not Received Isolation:  There are currently no Active Isolations       MDRO: No current active infections Pain Medications given:  none    Last dose: n/a Special Equipment needed: yes  Type of equipment: Peg tube BACKGROUND Allergies: Allergies Allergen Reactions  Shellfish Derived Unable to Obtain Past Medical History:  
Diagnosis Date  A-fib ([facility] [address]  Anemia  Chronic kidney disease  CVA (cerebrovascular accident) ([address] BILATERAL BASAL GANGLIA  Diabetes ([address] INSULIN DEPENDANT  Gastrointestinal disorder GERD/GASTRIC ULCERS  
 Hypertension  Psychiatric disorder SCHIZOAFFECTIVE  Seizures ([address] Past Surgical History:  
Procedure Laterality Date  ABDOMEN SURGERY PROC UNLISTED    
 PEG PLACEMENT  [place] EPHYS EVAL W/ABLATION SUPRAVENT ARRHYTHMIA N/A 2/18/2019 ABLATION A-FLUTTER performed by Agus Bustamante MD at [facility]ATH [facility] Medications Prior to Admission Medication Sig  
 insulin lispro (HUMALOG) 100 unit/mL injection 2-8 Units by SubCUTAneous route Before breakfast, lunch, dinner and at bedtime. Blood Glucose (mg/dL)       Insulin Dose (units). <60                                Call MD 
            150-200                               0  
            201-250                               2  
            251-300                               4  
            301-350                               6  
            351-400                               8  
            >401                               Call MD  
 insulin NPH (NOVOLIN N NPH U-100 INSULIN) 100 unit/mL injection 15 Units by SubCUTAneous route every twelve (12) hours.  atorvastatin (LIPITOR) 80 mg tablet 80 mg by Per G Tube route nightly.  baclofen (LIORESAL) 10 mg tablet 5 mg by Per G Tube route two (2) times a day.  levETIRAcetam (KEPPRA) 100 mg/mL solution 750 mg by Per G Tube route every twelve (12) hours.  nystatin (MYCOSTATIN) 100,000 unit/mL suspension Take 1 tsp by mouth four (4) times daily. swish and spit  pantoprazole (PROTONIX) 40 mg granules for oral suspension 40 mg by Per G Tube route Daily (before breakfast).  predniSONE (DELTASONE) 10 mg tablet 5-10 mg by Per G Tube route Follow dosing instructions. Tale 10 mg daily for 5 days, then take 5 mg daily for 5 days  sertraline (ZOLOFT) 50 mg tablet 100 mg by Per G Tube route daily.  warfarin (COUMADIN) 7.5 mg tablet Take 6 mg by mouth every evening.  acetaminophen (TYLENOL) 325 mg tablet 650 mg by Per G Tube route every three (3) hours as needed for Pain.  albuterol-ipratropium (DUO-NEB) 2.5 mg-0.5 mg/3 ml nebu 3 mL by Nebulization route every three (3) hours as needed.  guaiFENesin (ROBITUSSIN) 100 mg/5 mL liquid Take 200 mg by mouth every six (6) hours as needed for Cough.  traMADol (ULTRAM) 50 mg tablet 25-50 mg by Per G Tube route every four (4) hours as needed for Pain.  [DISCONTINUED] dilTIAZem (CARDIZEM) 30 mg tablet 90 mg by Per G Tube route four (4) times daily. Hard scripts included in transfer packet no Vaccinations: There is no immunization history on file for this patient. Readmission Risks:    Known Risks: none The Charlson CoMorbitiy Index tool is an evidenced based tool that has more automatic generated information. The tool looks at many different items such as the age of the patient, how many times they were admitted in the last calendar year, current length of stay in the hospital and their diagnosis. All of these items are pulled automatically from information documented in the chart from various places and will generate a score that predicts whether a patient is at low (less than 13), medium (13-20) or high (21 or greater) risk of being readmitted. ASSESSMENT Temp: 97 °F (36.1 °C) (02/19/19 1119) Pulse (Heart Rate): 88 (02/19/19 1119) Resp Rate: 12 (02/19/19 1119)           BP: (!) 152/98 (02/19/19 1119) O2 Sat (%): 94 % (02/19/19 1119) Weight: 65.4 kg (144 lb 1.6 oz)    Height: (not recorded) If above not within 1 hour of discharge: 
 
BP:_____  P:____  R:____ T:_____ O2 Sat: ___%  O2: ______ Active Orders There are no active orders of the following type(s): Diet. Orientation: oriented to time, place, person and situation Active Behaviors: None Active Lines/Drains:  (Peg Tube / Parikh / CL or S/L?): yes Peg Tube and Condom Cath Urinary Status: External catheter     Last BM:    
 
Skin Integrity: Incision (comment)(r groin, abd peg tube) Mobility: Very limited Weight Bearing Status: WBAT (Weight Bearing as Tolerated) Lab Results Component Value Date/Time Glucose 194 (H) 02/18/2019 10:17 AM  
 Hemoglobin A1c 7.6 (H) 02/18/2019 06:27 PM  
 INR 1.8 (H) 02/19/2019 04:03 AM  
 INR 1.7 (H) 02/18/2019 10:17 AM  
 HGB 9.6 (L) 02/19/2019 04:03 AM  
 HGB 10.3 (L) 02/18/2019 10:17 AM  
  
  RECOMMENDATION See After Visit Summary (AVS) for: · Discharge instructions · [facility] · Special equipment needed (entered pre-discharge by Care Management) · Medication Reconciliation · Follow up Appointment(s) Report given/sent by:  Mati Shelton RN Verbal report given to: Nicole Velez RN   FAXED to:  [facility] DELEON Estimated discharge time:  2/19/2019 at

## 2019-02-19 NOTE — PROGRESS NOTES
Orders received, chart reviewed and patient evaluated by physical therapy. Recommend patient to discharge to inpatient rehab pending progress with skilled acute care physical therapy. Full evaluation to follow.   
 
Thank you, 
Sandy Poon, PT, DPT

## 2019-02-19 NOTE — PROGRESS NOTES
Problem: Falls - Risk of 
Goal: *Absence of Falls Document Cass Montoya Fall Risk and appropriate interventions in the flowsheet. Outcome: Progressing Towards Goal 
Fall Risk Interventions: 
  
 
  
 
Medication Interventions: Assess postural VS orthostatic hypotension, Evaluate medications/consider consulting pharmacy, Patient to call before getting OOB, Teach patient to arise slowly Elimination Interventions: Call light in reach, Patient to call for help with toileting needs

## 2019-02-19 NOTE — PROGRESS NOTES
Cardiology Progress Note [address]  [phone] 2/19/2019 9:19 AM 
 
Admit Date: 2/18/2019 Admit Diagnosis: Atrial flutter ([address] [I48.92] Subjective:  
 
Malina Harden   denies chest pain. Visit Vitals /77 Pulse 92 Temp 97.6 °F (36.4 °C) Resp 16 Wt 144 lb 1.6 oz (65.4 kg) SpO2 95% Current Facility-Administered Medications Medication Dose Route Frequency  enoxaparin (LOVENOX) injection 60 mg  60 mg SubCUTAneous Q12H  
 dilTIAZem (CARDIZEM) 125 mg in dextrose 5% 125 mL infusion  0-15 mg/hr IntraVENous TITRATE  acetaminophen (TYLENOL) tablet 650 mg  650 mg Per G Tube Q6H PRN  
 albuterol-ipratropium (DUO-NEB) 2.5 MG-0.5 MG/3 ML  3 mL Nebulization Q6H PRN  
 atorvastatin (LIPITOR) tablet 80 mg  80 mg Oral DAILY  glucose chewable tablet 16 g  4 Tab Oral PRN  
 dextrose (D50W) injection syrg 12.5-25 g  25-50 mL IntraVENous PRN  
 glucagon (GLUCAGEN) injection 1 mg  1 mg IntraMUSCular PRN  
 insulin lispro (HUMALOG) injection   SubCUTAneous AC&HS  sodium chloride (NS) flush 5-40 mL  5-40 mL IntraVENous Q8H  
 sodium chloride (NS) flush 5-40 mL  5-40 mL IntraVENous PRN  
 enoxaparin (LOVENOX) injection 60 mg  60 mg SubCUTAneous Q12H  
 0.9% sodium chloride infusion  50 mL/hr IntraVENous CONTINUOUS  
 baclofen (LIORESAL) tablet 5 mg  5 mg Oral TID  insulin NPH (NOVOLIN N, HUMULIN N) injection 5 Units  5 Units SubCUTAneous BID  predniSONE (DELTASONE) tablet 5 mg  5 mg Oral DAILY WITH BREAKFAST  pantoprazole (PROTONIX) tablet 40 mg  40 mg Oral ACB  nystatin (MYCOSTATIN) 100,000 unit/mL oral suspension 500,000 Units  500,000 Units Oral QID  sertraline (ZOLOFT) tablet 100 mg  100 mg Per G Tube DAILY  WARFARIN INFORMATION NOTE (COUMADIN)   Other QPM  
 levETIRAcetam (KEPPRA) solution 750 mg  750 mg Oral BID Objective:  
  
Visit Vitals /77 Pulse 92 Temp 97.6 °F (36.4 °C) Resp 16  
 Wt 144 lb 1.6 oz (65.4 kg) SpO2 95% Physical Exam: Abdomen: soft, non-tender Extremities: extremities normal 
Heart: regular rate and rhythm Lungs: clear to auscultation bilaterally Pulses: 2+ and symmetric Data Review:  
Labs:   
Recent Labs  
  02/19/19 
0403 02/18/19 [facility] WBC 8.7 12.3* HGB 9.6* 10.3* HCT 28.8* 30.9*  398 Recent Labs  
  02/19/19 
0403 [facility]19 [facility] 02/18/19 
0445 NA  --  140 139 K  --  4.4 4.2 CL  --  104 104 CO2  --  30 29 GLU  --  194* 161* BUN  --  26* 25* CREA  --  1.34* 1.20 CA  --  8.5 8.4* MG  --  2.3  --   
ALB  --  2.6*  --   
TBILI  --  0.2  --   
SGOT  --  14*  --   
ALT  --  39  --   
INR 1.8* 1.7*  --   
 
 
Recent Labs  
  02/18/19 [facility] TROIQ <0.05  
CPK 23* Intake/Output Summary (Last 24 hours) at 2/19/2019 5389 Last data filed at 2/19/2019 0600 Gross per 24 hour Intake  Output 900 ml Net -900 ml Telemetry: nsr Assessment:  
 
Active Problems: 
  Atrial flutter ([facility] [address] (2/18/2019) CVA, old, aphasia (2/18/2019) Diabetes mellitus type 2, controlled ([address] (2/18/2019) Other hyperlipidemia (2/18/2019) Stroke (cerebrum) ([facility] [address] [address] (2/18/2019) Plan:  
 
Suzanne Bernard is sp afl abl. In sinus. Will need lovenox sq bid until inr is 2 or greater. Will sign off. Please call with ?s Leyla Candelario MD, [facility] 
 
2/19/2019

## 2019-02-19 NOTE — DISCHARGE INSTRUCTIONS
Daily INR at [facility]. Discontinue SQ Lovenox once INR in between 2-3 and continue with coumadin        [address], [place], [address] [facility]  [address] INSTRUCTIONS    Patient ID:  Lore Alexander  [id number]  89 y.o.  1970    Admit Date: 2/18/2019    Discharge Date: 2/18/2019     Admitting Physician: Beth Benitez MD     Discharge Physician: Beth Benitez MD    Admission Diagnoses:   Atrial flutter [facility]) [I48.92]    Discharge Diagnoses: Active Problems:    Atrial flutter ([facility] [address] (2/18/2019)      CVA, old, aphasia (2/18/2019)      Diabetes mellitus type 2, controlled ([facility] [address] (2/18/2019)      Other hyperlipidemia (2/18/2019)      Stroke (cerebrum) ([facility] [address] (2/18/2019)        Discharge Condition: Good    Cardiology Procedures this Admission:  Atrial flutter. Disposition: home    Reference discharge instructions provided by nursing for diet and activity. Follow-up with Dr Sharee Nickerson or his Nurse Practitioners in 1-2 weeks. Call [phone] to make an appointment. Signed:  AMANDA Sears  2/18/2019  12:41 PM    S/P ATRIAL FLUTTER ABLATION DISCHARGE INSTRUCTIONS    It is normal to feel tired the first couple days. Take it easy and follow the physicians instructions. CHECK THE CATHETER INSERTION SITE DAILY:  You may shower 24 hours after the procedure, remove the bandage during showering. Wash with soap and water and pat dry. Gentle cleaning of the site with soap and water is sufficient, cover with a dry clean dressing or bandage. Do not apply creams or powders to the area. Do not sit in a bathtub or pool of water for 7 days or until wound has completely healed. Temporary bruising and discomfort is normal and may last a few weeks. You may have a  formation of a small lump at the site which may last up to 6 weeks.     CALL THE PHYSICIAN:  If the site becomes red, swollen or feels warm to the touch  If there is bleeding or drainage or if there is unusual pain at the groin or down the leg. If there is any bleeding, lie down, apply pressure or have someone apply pressure with a clean cloth until the bleeding stops. If the bleeding continues, call 911 to be transported to the hospital.  DO NOT DRIVE YOURSELF, [address]. Activity:      For the first 24-48 hours or as instructed by the physician:  No lifting, pushing or pulling over 5 pounds and no straining the insertion site. Do not life grocery bags or the garbage can, do not run the vacuum  or  for 7 days. Start with short walks as in the hospital and gradually increase as tolerated each day. It is recommended to walk 30 minutes 5-7 days per week. Follow your physicians instructions on activity. Avoid walking outside in extremes of heat or cold. Walk inside when it is cold and windy or hot and humid. Things to keep in mind:  No driving for at least 5 days or as designated by your physician. Limit the number of times you go up and down the stairs  Take rests and pace yourself with activity. Be careful and do not strain with bowel movements. Medications: Take all medications as prescribed  Call your physician if you have any questions  Keep an updated list of your medications with you at all times and give a list to your physician and pharmacist    Signs and Symptoms:  Be cautious of symptoms of angina or recurrent symptoms such as chest discomfort, unusual shortness of breath or fatigue, palpitations. After Care: Follow up with your physician as instructed. Follow a heart healthy diet with proper portion control, daily stress management, daily exercise, blood pressure and cholesterol control , and smoking cessation. AFTER YOU TRANSESOPHAGEAL ECHOCARDIOGRAM    Do not eat or drink for at least two hours after your procedure. Your throat will be numb and there is a risk you might have difficulty swallowing for a while.  Be careful when you do eat or drink for the first time especially with hot fluids since you could easily burn your throat. Call your doctor if:    · You are bleeding from your throat or mouth. · You have trouble breathing all of a sudden. · You have chest pain or any pain that spreads to your neck, jaw, or arms. · You have questions or concerns.   · You have a fever greater than 101°F.

## 2019-02-19 NOTE — PROGRESS NOTES
Orders received, chart reviewed and patient evaluated by occupational therapy. Recommend patient to discharge to inpatient rehab to resume therapy for new CVA pending progression with skilled acute occupational therapy. Full evaluation to follow.

## 2019-02-20 LAB
ATRIAL RATE: 94 BPM
CALCULATED P AXIS, ECG09: 63 DEGREES
CALCULATED R AXIS, ECG10: 10 DEGREES
CALCULATED T AXIS, ECG11: 87 DEGREES
DIAGNOSIS, 93000: NORMAL
P-R INTERVAL, ECG05: 166 MS
Q-T INTERVAL, ECG07: 386 MS
QRS DURATION, ECG06: 84 MS
QTC CALCULATION (BEZET), ECG08: 482 MS
VENTRICULAR RATE, ECG03: 94 BPM

## 2019-02-23 LAB
ANION GAP SERPL CALC-SCNC: 5 MMOL/L (ref 5–15)
BUN SERPL-MCNC: 26 MG/DL (ref 6–20)
BUN/CREAT SERPL: 21 (ref 12–20)
CALCIUM SERPL-MCNC: 8.6 MG/DL (ref 8.5–10.1)
CHLORIDE SERPL-SCNC: 105 MMOL/L (ref 97–108)
CO2 SERPL-SCNC: 29 MMOL/L (ref 21–32)
CREAT SERPL-MCNC: 1.26 MG/DL (ref 0.7–1.3)
ERYTHROCYTE [DISTWIDTH] IN BLOOD BY AUTOMATED COUNT: 14.2 % (ref 11.5–14.5)
GLUCOSE SERPL-MCNC: 112 MG/DL (ref 65–100)
HCT VFR BLD AUTO: 29 % (ref 36.6–50.3)
HGB BLD-MCNC: 10 G/DL (ref 12.1–17)
MCH RBC QN AUTO: 31.5 PG (ref 26–34)
MCHC RBC AUTO-ENTMCNC: 34.5 G/DL (ref 30–36.5)
MCV RBC AUTO: 91.5 FL (ref 80–99)
NRBC # BLD: 0 K/UL (ref 0–0.01)
NRBC BLD-RTO: 0 PER 100 WBC
PLATELET # BLD AUTO: 323 K/UL (ref 150–400)
PMV BLD AUTO: 9.8 FL (ref 8.9–12.9)
POTASSIUM SERPL-SCNC: 4.2 MMOL/L (ref 3.5–5.1)
RBC # BLD AUTO: 3.17 M/UL (ref 4.1–5.7)
SODIUM SERPL-SCNC: 139 MMOL/L (ref 136–145)
WBC # BLD AUTO: 8.8 K/UL (ref 4.1–11.1)

## 2019-02-23 PROCEDURE — 85027 COMPLETE CBC AUTOMATED: CPT

## 2019-02-23 PROCEDURE — 80048 BASIC METABOLIC PNL TOTAL CA: CPT

## 2019-02-23 PROCEDURE — 36415 COLL VENOUS BLD VENIPUNCTURE: CPT

## 2019-02-23 PROCEDURE — 80177 DRUG SCRN QUAN LEVETIRACETAM: CPT

## 2019-02-24 LAB — LEVETIRACETAM SERPL-MCNC: 32.3 UG/ML (ref 10–40)

## 2019-02-28 ENCOUNTER — APPOINTMENT (OUTPATIENT)
Dept: INTERVENTIONAL RADIOLOGY/VASCULAR | Age: 49
End: 2019-02-28
Attending: PHYSICAL MEDICINE & REHABILITATION

## 2019-02-28 VITALS
SYSTOLIC BLOOD PRESSURE: 125 MMHG | DIASTOLIC BLOOD PRESSURE: 76 MMHG | RESPIRATION RATE: 18 BRPM | HEART RATE: 88 BPM | OXYGEN SATURATION: 95 %

## 2019-02-28 PROCEDURE — 77001 FLUOROGUIDE FOR VEIN DEVICE: CPT

## 2019-02-28 PROCEDURE — 74011000250 HC RX REV CODE- 250: Performed by: RADIOLOGY

## 2019-02-28 PROCEDURE — 77030018836 HC SOL IRR NACL ICUM -A

## 2019-02-28 PROCEDURE — 77030011229 HC DIL VESL COON COOK -A

## 2019-02-28 PROCEDURE — 74011636320 HC RX REV CODE- 636/320

## 2019-02-28 PROCEDURE — 77030027899 HC CATH BLN GASTMY REPL BSC -B

## 2019-02-28 PROCEDURE — 77030011230 HC DIL VESL COON COOK -B

## 2019-02-28 PROCEDURE — C1769 GUIDE WIRE: HCPCS

## 2019-02-28 RX ORDER — LIDOCAINE HYDROCHLORIDE 20 MG/ML
JELLY TOPICAL
Status: DISPENSED
Start: 2019-02-28 | End: 2019-02-28

## 2019-02-28 RX ORDER — LIDOCAINE HYDROCHLORIDE 20 MG/ML
INJECTION, SOLUTION EPIDURAL; INFILTRATION; INTRACAUDAL; PERINEURAL
Status: DISCONTINUED
Start: 2019-02-28 | End: 2019-02-28 | Stop reason: SDUPTHER

## 2019-02-28 RX ORDER — LIDOCAINE HYDROCHLORIDE 20 MG/ML
5 JELLY TOPICAL AS NEEDED
Status: DISCONTINUED | OUTPATIENT
Start: 2019-02-28 | End: 2019-03-29 | Stop reason: HOSPADM

## 2019-02-28 RX ORDER — HEPARIN SODIUM 200 [USP'U]/100ML
400 INJECTION, SOLUTION INTRAVENOUS ONCE
Status: ACTIVE | OUTPATIENT
Start: 2019-02-28 | End: 2019-02-28

## 2019-02-28 RX ADMIN — LIDOCAINE HYDROCHLORIDE 5 ML: 20 JELLY TOPICAL at 11:54

## 2019-02-28 RX ADMIN — IOPAMIDOL 10 ML: 755 INJECTION, SOLUTION INTRAVENOUS at 11:54

## 2019-02-28 NOTE — ROUTINE PROCESS
Procedure reviewed with patient by Dr. Kaila Fox. Opportunity to verbalize questions and concerns. Consent obtained.

## 2019-03-04 LAB
ALBUMIN SERPL-MCNC: 3.3 G/DL (ref 3.5–5)
ALBUMIN/GLOB SERPL: 0.9 {RATIO} (ref 1.1–2.2)
ALP SERPL-CCNC: 117 U/L (ref 45–117)
ALT SERPL-CCNC: 57 U/L (ref 12–78)
ANION GAP SERPL CALC-SCNC: 5 MMOL/L (ref 5–15)
AST SERPL-CCNC: 26 U/L (ref 15–37)
BILIRUB SERPL-MCNC: 0.4 MG/DL (ref 0.2–1)
BUN SERPL-MCNC: 31 MG/DL (ref 6–20)
BUN/CREAT SERPL: 23 (ref 12–20)
CALCIUM SERPL-MCNC: 9 MG/DL (ref 8.5–10.1)
CHLORIDE SERPL-SCNC: 106 MMOL/L (ref 97–108)
CO2 SERPL-SCNC: 29 MMOL/L (ref 21–32)
CREAT SERPL-MCNC: 1.37 MG/DL (ref 0.7–1.3)
ERYTHROCYTE [DISTWIDTH] IN BLOOD BY AUTOMATED COUNT: 13.8 % (ref 11.5–14.5)
GLOBULIN SER CALC-MCNC: 3.8 G/DL (ref 2–4)
GLUCOSE SERPL-MCNC: 85 MG/DL (ref 65–100)
HCT VFR BLD AUTO: 32.4 % (ref 36.6–50.3)
HGB BLD-MCNC: 11.2 G/DL (ref 12.1–17)
MCH RBC QN AUTO: 31.6 PG (ref 26–34)
MCHC RBC AUTO-ENTMCNC: 34.6 G/DL (ref 30–36.5)
MCV RBC AUTO: 91.5 FL (ref 80–99)
NRBC # BLD: 0 K/UL (ref 0–0.01)
NRBC BLD-RTO: 0 PER 100 WBC
PLATELET # BLD AUTO: 385 K/UL (ref 150–400)
PMV BLD AUTO: 9.8 FL (ref 8.9–12.9)
POTASSIUM SERPL-SCNC: 4.5 MMOL/L (ref 3.5–5.1)
PROT SERPL-MCNC: 7.1 G/DL (ref 6.4–8.2)
RBC # BLD AUTO: 3.54 M/UL (ref 4.1–5.7)
SODIUM SERPL-SCNC: 140 MMOL/L (ref 136–145)
WBC # BLD AUTO: 9.4 K/UL (ref 4.1–11.1)

## 2019-03-04 PROCEDURE — 80053 COMPREHEN METABOLIC PANEL: CPT

## 2019-03-04 PROCEDURE — 36415 COLL VENOUS BLD VENIPUNCTURE: CPT

## 2019-03-04 PROCEDURE — 85027 COMPLETE CBC AUTOMATED: CPT

## 2019-03-11 LAB
ANION GAP SERPL CALC-SCNC: 5 MMOL/L (ref 5–15)
BUN SERPL-MCNC: 46 MG/DL (ref 6–20)
BUN/CREAT SERPL: 29 (ref 12–20)
CALCIUM SERPL-MCNC: 8.8 MG/DL (ref 8.5–10.1)
CHLORIDE SERPL-SCNC: 104 MMOL/L (ref 97–108)
CO2 SERPL-SCNC: 28 MMOL/L (ref 21–32)
CREAT SERPL-MCNC: 1.58 MG/DL (ref 0.7–1.3)
ERYTHROCYTE [DISTWIDTH] IN BLOOD BY AUTOMATED COUNT: 13.5 % (ref 11.5–14.5)
GLUCOSE SERPL-MCNC: 87 MG/DL (ref 65–100)
HCT VFR BLD AUTO: 31.9 % (ref 36.6–50.3)
HGB BLD-MCNC: 11 G/DL (ref 12.1–17)
MCH RBC QN AUTO: 31.1 PG (ref 26–34)
MCHC RBC AUTO-ENTMCNC: 34.5 G/DL (ref 30–36.5)
MCV RBC AUTO: 90.1 FL (ref 80–99)
NRBC # BLD: 0 K/UL (ref 0–0.01)
NRBC BLD-RTO: 0 PER 100 WBC
PLATELET # BLD AUTO: 351 K/UL (ref 150–400)
PMV BLD AUTO: 10.2 FL (ref 8.9–12.9)
POTASSIUM SERPL-SCNC: 4.7 MMOL/L (ref 3.5–5.1)
RBC # BLD AUTO: 3.54 M/UL (ref 4.1–5.7)
SODIUM SERPL-SCNC: 137 MMOL/L (ref 136–145)
WBC # BLD AUTO: 9.1 K/UL (ref 4.1–11.1)

## 2019-03-11 PROCEDURE — 80048 BASIC METABOLIC PNL TOTAL CA: CPT

## 2019-03-11 PROCEDURE — 36415 COLL VENOUS BLD VENIPUNCTURE: CPT

## 2019-03-11 PROCEDURE — 85027 COMPLETE CBC AUTOMATED: CPT

## 2019-03-14 ENCOUNTER — APPOINTMENT (OUTPATIENT)
Dept: GENERAL RADIOLOGY | Age: 49
End: 2019-03-14
Attending: PHYSICAL MEDICINE & REHABILITATION

## 2019-03-14 LAB
ANION GAP SERPL CALC-SCNC: 5 MMOL/L (ref 5–15)
BUN SERPL-MCNC: 40 MG/DL (ref 6–20)
BUN/CREAT SERPL: 26 (ref 12–20)
CALCIUM SERPL-MCNC: 8.7 MG/DL (ref 8.5–10.1)
CHLORIDE SERPL-SCNC: 102 MMOL/L (ref 97–108)
CO2 SERPL-SCNC: 30 MMOL/L (ref 21–32)
CREAT SERPL-MCNC: 1.54 MG/DL (ref 0.7–1.3)
GLUCOSE SERPL-MCNC: 75 MG/DL (ref 65–100)
POTASSIUM SERPL-SCNC: 4.9 MMOL/L (ref 3.5–5.1)
SODIUM SERPL-SCNC: 137 MMOL/L (ref 136–145)

## 2019-03-14 PROCEDURE — 80048 BASIC METABOLIC PNL TOTAL CA: CPT

## 2019-03-14 PROCEDURE — 36415 COLL VENOUS BLD VENIPUNCTURE: CPT

## 2019-03-18 ENCOUNTER — HOSPITAL ENCOUNTER (OUTPATIENT)
Dept: GENERAL RADIOLOGY | Age: 49
Discharge: HOME | End: 2019-03-18
Attending: PHYSICAL MEDICINE & REHABILITATION
Payer: MEDICAID

## 2019-03-18 DIAGNOSIS — R13.10 DYSPHAGIA: ICD-10-CM

## 2019-03-18 LAB
ANION GAP SERPL CALC-SCNC: 4 MMOL/L (ref 5–15)
BUN SERPL-MCNC: 42 MG/DL (ref 6–20)
BUN/CREAT SERPL: 28 (ref 12–20)
CALCIUM SERPL-MCNC: 8.7 MG/DL (ref 8.5–10.1)
CHLORIDE SERPL-SCNC: 104 MMOL/L (ref 97–108)
CO2 SERPL-SCNC: 30 MMOL/L (ref 21–32)
CREAT SERPL-MCNC: 1.5 MG/DL (ref 0.7–1.3)
ERYTHROCYTE [DISTWIDTH] IN BLOOD BY AUTOMATED COUNT: 13.2 % (ref 11.5–14.5)
GLUCOSE SERPL-MCNC: 119 MG/DL (ref 65–100)
HCT VFR BLD AUTO: 31.1 % (ref 36.6–50.3)
HGB BLD-MCNC: 10.6 G/DL (ref 12.1–17)
MCH RBC QN AUTO: 30.8 PG (ref 26–34)
MCHC RBC AUTO-ENTMCNC: 34.1 G/DL (ref 30–36.5)
MCV RBC AUTO: 90.4 FL (ref 80–99)
NRBC # BLD: 0 K/UL (ref 0–0.01)
NRBC BLD-RTO: 0 PER 100 WBC
PLATELET # BLD AUTO: 393 K/UL (ref 150–400)
PMV BLD AUTO: 9.8 FL (ref 8.9–12.9)
POTASSIUM SERPL-SCNC: 4.9 MMOL/L (ref 3.5–5.1)
RBC # BLD AUTO: 3.44 M/UL (ref 4.1–5.7)
SODIUM SERPL-SCNC: 138 MMOL/L (ref 136–145)
WBC # BLD AUTO: 9.1 K/UL (ref 4.1–11.1)

## 2019-03-18 PROCEDURE — 80177 DRUG SCRN QUAN LEVETIRACETAM: CPT

## 2019-03-18 PROCEDURE — 85027 COMPLETE CBC AUTOMATED: CPT

## 2019-03-18 PROCEDURE — 74230 X-RAY XM SWLNG FUNCJ C+: CPT

## 2019-03-18 PROCEDURE — 80048 BASIC METABOLIC PNL TOTAL CA: CPT

## 2019-03-18 PROCEDURE — 36415 COLL VENOUS BLD VENIPUNCTURE: CPT

## 2019-03-19 LAB — LEVETIRACETAM SERPL-MCNC: 43.8 UG/ML (ref 10–40)

## (undated) DEVICE — 3M™ TEGADERM™ TRANSPARENT FILM DRESSING FRAME STYLE, 1626W, 4 IN X 4-3/4 IN (10 CM X 12 CM), 50/CT 4CT/CASE: Brand: 3M™ TEGADERM™

## (undated) DEVICE — CATH RMFG DECA DUO 7F2/8 95S --

## (undated) DEVICE — INTRODUCER SHTH J 0.038 IN 3 MM 8 FRX60 CM DIL FAST-CATH

## (undated) DEVICE — REM POLYHESIVE ADULT PATIENT RETURN ELECTRODE: Brand: VALLEYLAB

## (undated) DEVICE — SYSTEM NAVIGATION W4XL7IN FR BK VIS TECHNOLOGY ENSITE NAVX

## (undated) DEVICE — PINNACLE INTRODUCER SHEATH: Brand: PINNACLE

## (undated) DEVICE — MEDI-TRACE CADENCE ADULT, DEFIBRILLATION ELECTRODE -RTS  (10 PR/PK) - PHILIPS: Brand: MEDI-TRACE CADENCE

## (undated) DEVICE — CATH RMFG CABLE CONN ABL -- LAWSON OEM ITEM 193469

## (undated) DEVICE — PACK PROCEDURE SURG HRT CATH

## (undated) DEVICE — MEDI-VAC NON-CONDUCTIVE SUCTION TUBING: Brand: CARDINAL HEALTH

## (undated) DEVICE — CABLE EP L150CM RED HEXAPOLAR OCTAPOLAR DECAPOLAR EXTN CONN

## (undated) DEVICE — CATHETER EP L110CM OD8FR L10MM 2.5MM SPC QPLR STR TIP BLZR